# Patient Record
Sex: FEMALE | Race: WHITE | Employment: FULL TIME | URBAN - METROPOLITAN AREA
[De-identification: names, ages, dates, MRNs, and addresses within clinical notes are randomized per-mention and may not be internally consistent; named-entity substitution may affect disease eponyms.]

---

## 2017-03-23 ENCOUNTER — EMPLOYEE WELLNESS (OUTPATIENT)
Dept: OTHER | Age: 57
End: 2017-03-23

## 2017-12-19 PROBLEM — M18.12 ARTHRITIS OF CARPOMETACARPAL (CMC) JOINT OF LEFT THUMB: Status: ACTIVE | Noted: 2017-12-19

## 2018-03-01 ENCOUNTER — EMPLOYEE WELLNESS (OUTPATIENT)
Dept: OTHER | Age: 58
End: 2018-03-01

## 2018-03-01 LAB
CHOLESTEROL, TOTAL: 184 MG/DL (ref 0–199)
GLUCOSE BLD-MCNC: 86 MG/DL (ref 74–107)
HDLC SERPL-MCNC: 77 MG/DL
LDL CHOLESTEROL CALCULATED: 95 MG/DL (ref 0–99)
TRIGL SERPL-MCNC: 59 MG/DL (ref 0–149)

## 2018-03-20 VITALS — WEIGHT: 135 LBS | BODY MASS INDEX: 21.79 KG/M2

## 2018-03-29 ENCOUNTER — OFFICE VISIT (OUTPATIENT)
Dept: ORTHOPEDIC SURGERY | Age: 58
End: 2018-03-29
Payer: COMMERCIAL

## 2018-03-29 VITALS
DIASTOLIC BLOOD PRESSURE: 90 MMHG | TEMPERATURE: 97.2 F | HEIGHT: 66 IN | SYSTOLIC BLOOD PRESSURE: 141 MMHG | HEART RATE: 73 BPM | BODY MASS INDEX: 21.21 KG/M2 | WEIGHT: 132 LBS

## 2018-03-29 DIAGNOSIS — G56.01 CARPAL TUNNEL SYNDROME OF RIGHT WRIST: ICD-10-CM

## 2018-03-29 DIAGNOSIS — M18.12 ARTHRITIS OF CARPOMETACARPAL (CMC) JOINT OF LEFT THUMB: Primary | ICD-10-CM

## 2018-03-29 PROCEDURE — L3908 WHO COCK-UP NONMOLDE PRE OTS: HCPCS | Performed by: ORTHOPAEDIC SURGERY

## 2018-03-29 PROCEDURE — 20600 DRAIN/INJ JOINT/BURSA W/O US: CPT | Performed by: ORTHOPAEDIC SURGERY

## 2018-03-29 PROCEDURE — 99213 OFFICE O/P EST LOW 20 MIN: CPT | Performed by: ORTHOPAEDIC SURGERY

## 2018-03-29 RX ORDER — BUPIVACAINE HYDROCHLORIDE 2.5 MG/ML
2 INJECTION, SOLUTION INFILTRATION; PERINEURAL ONCE
Status: COMPLETED | OUTPATIENT
Start: 2018-03-29 | End: 2018-03-29

## 2018-03-29 RX ADMIN — BUPIVACAINE HYDROCHLORIDE 5 MG: 2.5 INJECTION, SOLUTION INFILTRATION; PERINEURAL at 15:38

## 2018-03-29 NOTE — PROGRESS NOTES
Halina Miller  presents with recurring pain at the base of the left thumb, had a couple of months of significant relief following the injection the pain is recently recurring interfering with  and other daily activities. She request repeat injection today. Also complains of chronic numbness tingling in the radial fingers of the right hand and thumb which is interfering with sleep and sometimes with work. No such symptoms of numbness on the left side no other joint complaints. Allergies; medications; past medical, surgical, family, and social history; and problem list have been reviewed today and updated as indicated in this encounter. Exam: Left hand shows swelling and tenderness the CMC of the thumb, negative Finkelstein, intact motor sensory exam no thenar or intrinsic atrophy. Right hand also shows no intrinsic or thenar atrophy but positive Phalen's negative Tinel's at the wrist.    Radiographs: Prior x-rays left hand confirming advanced if not end-stage CMC arthritis left thumb. Assessment and Plan: Impression is CMC arthritis left thumb, probable carpal tunnel syndrome right hand. At the patient's request I injected left thumb CMC with Kenalog and Marcaine without difficulty complication tolerated well. Also gave her a resting braces for the right hand to be worn at night and we discussed other treatment alternatives of carpal tunnel symptoms persist with possible nerve conduction studies to follow. Questions asked and answered follow-up as needed.

## 2018-04-02 VITALS — BODY MASS INDEX: 21.47 KG/M2 | WEIGHT: 133 LBS

## 2018-12-11 ENCOUNTER — OFFICE VISIT (OUTPATIENT)
Dept: ORTHOPEDIC SURGERY | Age: 58
End: 2018-12-11
Payer: COMMERCIAL

## 2018-12-11 VITALS
WEIGHT: 130 LBS | DIASTOLIC BLOOD PRESSURE: 83 MMHG | TEMPERATURE: 97.1 F | HEIGHT: 66 IN | HEART RATE: 70 BPM | BODY MASS INDEX: 20.89 KG/M2 | SYSTOLIC BLOOD PRESSURE: 137 MMHG

## 2018-12-11 DIAGNOSIS — M18.12 ARTHRITIS OF CARPOMETACARPAL (CMC) JOINT OF LEFT THUMB: Primary | ICD-10-CM

## 2018-12-11 PROCEDURE — 99213 OFFICE O/P EST LOW 20 MIN: CPT | Performed by: ORTHOPAEDIC SURGERY

## 2018-12-11 PROCEDURE — 20600 DRAIN/INJ JOINT/BURSA W/O US: CPT | Performed by: ORTHOPAEDIC SURGERY

## 2018-12-11 RX ORDER — BUPIVACAINE HYDROCHLORIDE 2.5 MG/ML
2 INJECTION, SOLUTION INFILTRATION; PERINEURAL ONCE
Status: COMPLETED | OUTPATIENT
Start: 2018-12-11 | End: 2018-12-11

## 2018-12-11 RX ORDER — TRIAMCINOLONE ACETONIDE 40 MG/ML
20 INJECTION, SUSPENSION INTRA-ARTICULAR; INTRAMUSCULAR ONCE
Status: COMPLETED | OUTPATIENT
Start: 2018-12-11 | End: 2018-12-11

## 2018-12-11 RX ADMIN — BUPIVACAINE HYDROCHLORIDE 5 MG: 2.5 INJECTION, SOLUTION INFILTRATION; PERINEURAL at 09:26

## 2018-12-11 RX ADMIN — TRIAMCINOLONE ACETONIDE 20 MG: 40 INJECTION, SUSPENSION INTRA-ARTICULAR; INTRAMUSCULAR at 09:26

## 2018-12-11 NOTE — PROGRESS NOTES
today for hand pain. Diagnoses and all orders for this visit:    Arthritis of carpometacarpal (CMC) joint of left thumb  -     [place] ARTHROCENTESIS ASPIR&/INJ SMALL JT/BURSA W/O US    Other orders  -     triamcinolone acetonide (KENALOG-40) injection 20 mg; Inject 0.5 mLs into the articular space once  -     bupivacaine (MARCAINE) 0.25 % injection 5 mg; Inject 2 mLs into the articular space once     Treatment options were again reviewed, the patient. She is doing well with bracing and occasional injections. With recurrence of pain and at her request today I injected left thumb CMC with Kenalog and Marcaine no difficulty or complication tolerated well. Continued use of over-the-counter as as needed was discussed, potential for surgical intervention versus ongoing conservative management as well. Questions asked and answered follow-up as needed. Return if symptoms worsen or fail to improve.      12/11/2018  9:45 AM      Patient Active Problem List   Diagnosis    Arthritis of carpometacarpal (CMC) joint of left thumb    Carpal tunnel syndrome of right wrist       Past Medical History:   Diagnosis Date    Left ankle instability     for or 10/21/2016    PONV (postoperative nausea and vomiting)     severe immediately per pt    Raynaud's disease     Skin cancer     neck/shoulder left    Wears glasses        Past Surgical History:   Procedure Laterality Date    ANKLE SURGERY  10/21/2016    stabilization left ankle    APPENDECTOMY      COLONOSCOPY  12/21/2017    normal    SKIN BIOPSY      TONSILLECTOMY         No Known Allergies    Social History     Social History    Marital status:      Spouse name: N/A    Number of children: N/A    Years of education: N/A     Social History Main Topics    Smoking status: Never Smoker    Smokeless tobacco: Never Used    Alcohol use 1.2 oz/week     2 Glasses of wine per week    Drug use: No    Sexual activity: No     Other Topics Concern    None     Social

## 2019-04-10 ENCOUNTER — HOSPITAL ENCOUNTER (OUTPATIENT)
Dept: GENERAL RADIOLOGY | Age: 59
Discharge: HOME | End: 2019-04-12
Payer: COMMERCIAL

## 2019-04-10 DIAGNOSIS — Z13.9 VISIT FOR SCREENING: ICD-10-CM

## 2019-04-10 PROCEDURE — 77063 BREAST TOMOSYNTHESIS BI: CPT

## 2019-04-16 ENCOUNTER — OFFICE VISIT (OUTPATIENT)
Dept: ORTHOPEDIC SURGERY | Age: 59
End: 2019-04-16
Payer: COMMERCIAL

## 2019-04-16 VITALS
HEART RATE: 69 BPM | DIASTOLIC BLOOD PRESSURE: 82 MMHG | TEMPERATURE: 97.7 F | WEIGHT: 130 LBS | SYSTOLIC BLOOD PRESSURE: 142 MMHG | HEIGHT: 66 IN | BODY MASS INDEX: 20.89 KG/M2

## 2019-04-16 DIAGNOSIS — M18.12 ARTHRITIS OF CARPOMETACARPAL (CMC) JOINT OF LEFT THUMB: Primary | ICD-10-CM

## 2019-04-16 PROCEDURE — 99213 OFFICE O/P EST LOW 20 MIN: CPT | Performed by: ORTHOPAEDIC SURGERY

## 2019-04-16 PROCEDURE — 20600 DRAIN/INJ JOINT/BURSA W/O US: CPT | Performed by: ORTHOPAEDIC SURGERY

## 2019-04-16 RX ORDER — LIDOCAINE HYDROCHLORIDE 10 MG/ML
0.5 INJECTION, SOLUTION INFILTRATION; PERINEURAL ONCE
Status: COMPLETED | OUTPATIENT
Start: 2019-04-16 | End: 2019-04-16

## 2019-04-16 RX ORDER — TRIAMCINOLONE ACETONIDE 40 MG/ML
20 INJECTION, SUSPENSION INTRA-ARTICULAR; INTRAMUSCULAR ONCE
Status: COMPLETED | OUTPATIENT
Start: 2019-04-16 | End: 2019-04-16

## 2019-04-16 RX ADMIN — LIDOCAINE HYDROCHLORIDE 0.5 ML: 10 INJECTION, SOLUTION INFILTRATION; PERINEURAL at 11:41

## 2019-04-16 RX ADMIN — TRIAMCINOLONE ACETONIDE 20 MG: 40 INJECTION, SUSPENSION INTRA-ARTICULAR; INTRAMUSCULAR at 11:42

## 2019-04-16 NOTE — PROGRESS NOTES
Chief Complaint:   Chief Complaint   Patient presents with    Finger Pain     Lt thumb pain, requesting injection. C/O reddened area at least once per week area of Lt [facility] joint. Disappears within 2-3 days, never breaks the skin and is never painful. Pt has pictures        Reynaldo Lee  presents with recurring pain at the basal left thumb, once again interfering with activities involving  pinch and dexterity, she notices occasional superficial discoloration, she showed me some pictures that suggested intradermal hemorrhage, these have resolved spontaneously and are occurring intermittently. No numbness tingling in the fingers no other joint complaints, no fever chills sweats weight loss or other constitutional symptoms. Allergies; medications; past medical, surgical, family, and social history; and problem list have been reviewed today and updated as indicated in this encounter seen below. Exam: Left thumb CMC is deformed, there is focal swelling and tenderness, no erythema appreciated but possibly some late resolving subcutaneous ecchymosis. CMC is tender to palpation with pain on axial load and rotation.  strength is painful. Radiographs: Deferred today previous radiographs are reviewed showing end-stage degenerative arthritis of the left thumb CMC. Ania Neri was seen today for finger pain. Diagnoses and all orders for this visit:    Arthritis of carpometacarpal (CMC) joint of left thumb  -     [place] ARTHROCENTESIS ASPIR&/INJ SMALL JT/BURSA W/O US    Other orders  -     triamcinolone acetonide (KENALOG-40) injection 20 mg  -     lidocaine 1 % injection 0.5 mL       Treatment alternatives were reviewed, the patient's request I injected the left thumb CMC with Kenalog and lidocaine this was done sterile technique without difficulty complication or bleeding.  It is not clear whether the discoloration she has seen is actually hemorrhage within the joint or possibly subcu is bleeding, the patient is not on any anticoagulants, she has not had any screening lab work other than glucose and cholesterol, she was advised to follow-up with primary care physician for formal well adult visit presumably to include more comprehensive blood work. Questions asked and answered follow-up here in a few months or as needed. Return if symptoms worsen or fail to improve. Current Outpatient Medications   Medication Sig Dispense Refill    TURMERIC PO Take by mouth daily      Probiotic Product (PROBIOTIC DAILY PO) Take by mouth daily      melatonin 3 MG TABS tablet Take 3 mg by mouth nightly as needed      Omega-3 Fatty Acids (OMEGA 3 PO) Take by mouth daily Ld 12/15      Calcium-Magnesium-Vitamin D (CALCIUM 500 PO) Take 1 tablet by mouth 2 times daily Ld 12/15/2017 all vitamins and supplements      Multiple Vitamins-Minerals (THERAPEUTIC MULTIVITAMIN-MINERALS) tablet Take 1 tablet by mouth daily Ld 12/15       No current facility-administered medications for this visit.         Patient Active Problem List   Diagnosis    Arthritis of carpometacarpal (CMC) joint of left thumb    Carpal tunnel syndrome of right wrist       Past Medical History:   Diagnosis Date    Left ankle instability     for or 10/21/2016    PONV (postoperative nausea and vomiting)     severe immediately per pt    Raynaud's disease     Skin cancer     neck/shoulder left    Wears glasses        Past Surgical History:   Procedure Laterality Date    ANKLE SURGERY  10/21/2016    stabilization left ankle    APPENDECTOMY      COLONOSCOPY  12/21/2017    normal    SKIN BIOPSY      TONSILLECTOMY         Allergies   Allergen Reactions    Anesthesia S-I-60 Nausea And Vomiting     Severe nausea and vomiting with anesthesia (drug name unknown to patient)        Social History     Socioeconomic History    Marital status:      Spouse name: None    Number of children: None    Years of education: None    Highest education level: None   Occupational History    None   Social Needs    Financial resource strain: None    Food insecurity:     Worry: None     Inability: None    Transportation needs:     Medical: None     Non-medical: None   Tobacco Use    Smoking status: Never Smoker    Smokeless tobacco: Never Used   Substance and Sexual Activity    Alcohol use: Yes     Alcohol/week: 1.2 oz     Types: 2 Glasses of wine per week    Drug use: No    Sexual activity: Never   Lifestyle    Physical activity:     Days per week: None     Minutes per session: None    Stress: None   Relationships    Social connections:     Talks on phone: None     Gets together: None     Attends Christianity service: None     Active member of club or organization: None     Attends meetings of clubs or organizations: None     Relationship status: None    Intimate partner violence:     Fear of current or ex partner: None     Emotionally abused: None     Physically abused: None     Forced sexual activity: None   Other Topics Concern    None   Social History Narrative    None       Family History   Problem Relation Age of Onset    Stroke Father     ADHD Brother         Adopted         Review of Systems  As follows except as previously noted in HPI:  Constitutional: Negative for chills, diaphoresis, fatigue, fever and unexpected weight change. Respiratory: Negative for cough, shortness of breath and wheezing. Cardiovascular: Negative for chest pain and palpitations. Neurological: Negative for dizziness, syncope, cephalgia. GI / : negative  Musculoskeletal: see HPI       Objective:   Physical Exam   Constitutional: Oriented to person, place, and time. and appears well-developed and well-nourished. :   Head: Normocephalic and atraumatic. Eyes: EOM are normal.   Neck: Neck supple. Cardiovascular: Normal rate and regular rhythm. Pulmonary/Chest: Effort normal. No stridor. No respiratory distress, no wheezes. Abdominal:  No abnormal distension.     Neurological: Alert

## 2019-07-10 ENCOUNTER — OFFICE VISIT (OUTPATIENT)
Dept: SURGERY | Age: 59
End: 2019-07-10
Payer: COMMERCIAL

## 2019-07-10 VITALS
OXYGEN SATURATION: 99 % | SYSTOLIC BLOOD PRESSURE: 122 MMHG | BODY MASS INDEX: 20.89 KG/M2 | RESPIRATION RATE: 18 BRPM | WEIGHT: 130 LBS | HEIGHT: 66 IN | DIASTOLIC BLOOD PRESSURE: 74 MMHG | TEMPERATURE: 98 F | HEART RATE: 77 BPM

## 2019-07-10 DIAGNOSIS — M18.12 ARTHRITIS OF CARPOMETACARPAL (CMC) JOINT OF LEFT THUMB: Primary | ICD-10-CM

## 2019-07-10 PROCEDURE — 99204 OFFICE O/P NEW MOD 45 MIN: CPT | Performed by: PLASTIC SURGERY

## 2019-07-10 NOTE — PROGRESS NOTES
Resp 18   Ht 5' 6\" (1.676 m)   Wt 130 lb (59 kg)   LMP  (LMP Unknown)   SpO2 99%   Breastfeeding? No   BMI 20.98 kg/m²   CONSTITUTIONAL:  awake, alert, cooperative, no apparent distress, and appears stated age  LUNGS:  No increased work of breathing, good air exchange, clear to auscultation bilaterally, no crackles or wheezing  CARDIOVASCULAR:  Normal apical impulse, regular rate and rhythm, normal S1 and S2, no S3 or S4, and no murmur noted      The depression is noted at the base of the right metacarpal approximately 2.5 cm round by 1 cm in depth there is mild tenderness to palpation as there is decreased soft tissue and palpable bone just deep to the dermis. Capillary refill of the dermis overlying the concavity is brisk. Full range of motion is noted to the right thumb  strength 5 of 5. 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 The only available treatments to replace lost fat in this instance would be autologous fat grafting. The patient states she is a very poor anesthesia candidate and is not willing to undergo anesthesia for elective type surgeries. Furthermore, I believe the trauma of injecting fat around the joint space or any other dermal filler for that matter may exacerbate her arthritis. I have discussed the concept of wide-awake surgery with the patient and she may want to consider a referral to our hand surgeon for discussion on correction of her significant arthritis. She voices understanding and appreciation. No plan surgical intervention at this time. Referral placed to orthopedic hand surgery Dr. Onita Dakins. F/U PRN    I attest that the patient was seen and examined by me, and concur with the documentation above. I agree with the assessment and the plan outlined.     This document is generated, in part, by voice recognition software and thus  syntax and grammatical errors are possible.     Yancy Pereira  10:23 AM  8/6/2019

## 2020-02-17 ENCOUNTER — TELEPHONE (OUTPATIENT)
Dept: ORTHOPEDIC SURGERY | Age: 60
End: 2020-02-17

## 2020-02-17 NOTE — TELEPHONE ENCOUNTER
Dr Lila Vizcarra office called requesting that patient appointment be moved up. Advised she is scheduled for 4/9/20 and I could add to cancellation list and also advise of the physician request to Dr Mari Cohn. Patient has already been under the care of Dr Jackie Peralta but wishes not to undergo treatment with him.

## 2020-02-28 ENCOUNTER — TELEPHONE (OUTPATIENT)
Dept: ORTHOPEDIC SURGERY | Age: 60
End: 2020-02-28

## 2020-03-02 ENCOUNTER — OFFICE VISIT (OUTPATIENT)
Dept: ORTHOPEDIC SURGERY | Age: 60
End: 2020-03-02
Payer: COMMERCIAL

## 2020-03-02 VITALS
SYSTOLIC BLOOD PRESSURE: 148 MMHG | BODY MASS INDEX: 21.38 KG/M2 | RESPIRATION RATE: 18 BRPM | WEIGHT: 133 LBS | DIASTOLIC BLOOD PRESSURE: 79 MMHG | HEIGHT: 66 IN | HEART RATE: 76 BPM

## 2020-03-02 PROCEDURE — 20605 DRAIN/INJ JOINT/BURSA W/O US: CPT | Performed by: ORTHOPAEDIC SURGERY

## 2020-03-02 PROCEDURE — 99203 OFFICE O/P NEW LOW 30 MIN: CPT | Performed by: ORTHOPAEDIC SURGERY

## 2020-03-02 RX ORDER — BETAMETHASONE SODIUM PHOSPHATE AND BETAMETHASONE ACETATE 3; 3 MG/ML; MG/ML
6 INJECTION, SUSPENSION INTRA-ARTICULAR; INTRALESIONAL; INTRAMUSCULAR; SOFT TISSUE ONCE
Status: COMPLETED | OUTPATIENT
Start: 2020-03-02 | End: 2020-03-02

## 2020-03-02 RX ADMIN — BETAMETHASONE SODIUM PHOSPHATE AND BETAMETHASONE ACETATE 6 MG: 3; 3 INJECTION, SUSPENSION INTRA-ARTICULAR; INTRALESIONAL; INTRAMUSCULAR; SOFT TISSUE at 12:46

## 2020-03-02 NOTE — PROGRESS NOTES
Right Upper Arm, Position: Sitting, Cuff Size: Medium Adult)   Pulse 76   Resp 18   Ht 5' 6\" (1.676 m)   Wt 133 lb (60.3 kg)   LMP  (LMP Unknown)   BMI 21.47 kg/m²   CONSTITUTIONAL:  awake, alert, cooperative, no apparent distress, and appears stated age  EYES:  Lids and lashes normal, pupils equal, round and reactive to light, extra ocular muscles intact, sclera clear, conjunctiva normal  ENT:  Normocephalic, without obvious abnormality, atraumatic, sinuses nontender on palpation, external ears without lesions, oral pharynx with moist mucus membranes, tonsils without erythema or exudates, gums normal and good dentition. NECK:  Supple, symmetrical, trachea midline, no adenopathy, thyroid symmetric, not enlarged and no tenderness, skin normal  NEUROLOGIC:  Awake, alert, oriented to name, place and time. Cranial nerves II-XII are grossly intact. Motor is 5 out of 5 bilaterally. Sensory is intact.  gait is normal.  MUSCULOSKELETAL:    Left upper extremity: Nontender about the shoulder and elbow with good range of motion. Negative Tinel's of the cubital tunnel, positive Tinel's of the carpal tunnel, negative Ladan's, negative Finkelstein's, positive CMC grind, positive atrophy over the [facility] joint of the thumb. Negative tenderness over the A1 pulleys with no active triggering. Full flexion-extension of the fingers. Negative Wartenberg's and cross finger testing. APB strength 5 out of 5. Median, ulnar, radial nerves intact light touch. Brisk capillary refill. Gross motor 5    DATA:    CBC:   Lab Results   Component Value Date    WBC 5.1 01/24/2012    RBC 4.20 01/24/2012    HGB 13.6 01/24/2012    HCT 39.2 01/24/2012    MCV 93.4 01/24/2012    MCH 32.4 01/24/2012    MCHC 34.7 01/24/2012    RDW 13.0 01/24/2012     01/24/2012    MPV 9.9 01/24/2012     PT/INR:  No results found for: PROTIME, INR    Radiology Review: X-rays of the left hand were obtained today in the office and reviewed with patient.   4 views: AP, lateral, oblique, dedicated thumb view demonstrate Eliana Marion stage III thumb CMC joint arthritis. No acute fractures or dislocations. Impression: Left thumb CMC joint arthritis    IMPRESSION:  · Left thumb CMC joint arthritis    PLAN:  Discussed findings with patient. Discussed conservative and surgical management with the patient. Did discuss she is a candidate for stablyx implant. Patient like to repeat a cortisone injection to her left thumb at today's visit. This was provided from the volar approach and tolerated well. Patient may repeat the injections every 3 months as needed. All questions answered. Procedure Note Wrist Thumb CMC Cortisone Injection    The left wrist thumb CMC joint was identified as the injection site. The risk and benefits of a cortisone injection were explained and the patient consented to the injection. Under sterile conditions, the wrist was injected with a mixture of 1 mL of 1% Lidocaine and 1 mL of 6 mg/mL Betamethasone without complication. A sterile bandage was applied    I have seen and evaluated the patient and agree with the above assessment and plan on today's visit. I have performed the key components of the history and physical examination with significant findings of left thumb basal joint arthritis. I concur with the findings and plan as documented.     Maicol Cheng MD  3/2/2020

## 2020-06-01 ENCOUNTER — OFFICE VISIT (OUTPATIENT)
Dept: ORTHOPEDIC SURGERY | Age: 60
End: 2020-06-01
Payer: COMMERCIAL

## 2020-06-01 VITALS — HEIGHT: 66 IN | WEIGHT: 132 LBS | TEMPERATURE: 97.7 F | RESPIRATION RATE: 18 BRPM | BODY MASS INDEX: 21.21 KG/M2

## 2020-06-01 PROCEDURE — 99214 OFFICE O/P EST MOD 30 MIN: CPT | Performed by: ORTHOPAEDIC SURGERY

## 2020-06-01 PROCEDURE — 20550 NJX 1 TENDON SHEATH/LIGAMENT: CPT | Performed by: ORTHOPAEDIC SURGERY

## 2020-06-01 RX ORDER — BETAMETHASONE SODIUM PHOSPHATE AND BETAMETHASONE ACETATE 3; 3 MG/ML; MG/ML
6 INJECTION, SUSPENSION INTRA-ARTICULAR; INTRALESIONAL; INTRAMUSCULAR; SOFT TISSUE ONCE
Status: COMPLETED | OUTPATIENT
Start: 2020-06-01 | End: 2020-06-01

## 2020-06-01 RX ADMIN — BETAMETHASONE SODIUM PHOSPHATE AND BETAMETHASONE ACETATE 6 MG: 3; 3 INJECTION, SUSPENSION INTRA-ARTICULAR; INTRALESIONAL; INTRAMUSCULAR; SOFT TISSUE at 10:15

## 2020-06-01 NOTE — PROGRESS NOTES
Department of Orthopedic Surgery  History and Physical      CHIEF COMPLAINT:  Left thumb pain    HISTORY OF PRESENT ILLNESS:                The patient is a RHD 62 y.o. female who presents with left thumb pain. Patient has had left thumb pain for the last 2 years. She has had 3 cortisone injections to the left thumb CMC joint. Following the injection she would have issues with fat atrophy. She was getting at least 6 months of pain relief with the injections. She reports good results with the previous injection however her symptoms have returned. She now has about a month of symptoms related to the lateral epicondylar region. She reports this is mostly while lifting weights. She reports no significant injuries. Past Medical History:        Diagnosis Date    Left ankle instability     for or 10/21/2016    PONV (postoperative nausea and vomiting)     severe immediately per pt    Raynaud's disease     Skin cancer     neck/shoulder left    Wears glasses      Past Surgical History:        Procedure Laterality Date    ANKLE SURGERY  10/21/2016    stabilization left ankle    APPENDECTOMY      COLONOSCOPY  12/21/2017    normal    SKIN BIOPSY      TONSILLECTOMY       Current Medications:   No current facility-administered medications for this visit. Allergies:  Anesthesia s-i-60    Social History:   TOBACCO:   reports that she has never smoked. She has never used smokeless tobacco.  ETOH:   reports current alcohol use of about 2.0 standard drinks of alcohol per week. DRUGS:   reports no history of drug use.   ACTIVITIES OF DAILY LIVING:    OCCUPATION:    Family History:       Problem Relation Age of Onset    Stroke Father     ADHD Brother         Adopted       REVIEW OF SYSTEMS:  CONSTITUTIONAL:  negative  EYES:  negative  HEENT:  negative  RESPIRATORY:  negative  CARDIOVASCULAR:  negative  GASTROINTESTINAL:  negative  GENITOURINARY:  negative  INTEGUMENT/BREAST:  negative  HEMATOLOGIC/LYMPHATIC: HGB 13.6 01/24/2012    HCT 39.2 01/24/2012    MCV 93.4 01/24/2012    MCH 32.4 01/24/2012    MCHC 34.7 01/24/2012    RDW 13.0 01/24/2012     01/24/2012    MPV 9.9 01/24/2012     PT/INR:  No results found for: PROTIME, INR    Radiology Review: X-rays of the left hand were  reviewed with patient. 4 views: AP, lateral, oblique, dedicated thumb view demonstrate Harpal Bulls stage III thumb CMC joint arthritis. No acute fractures or dislocations. Impression: Left thumb CMC joint arthritis    X-rays of the left elbow AP and lateral views were obtained today in the office. Negative for acute fractures or dislocations. Soft tissues within normal limits. Impression office x-rays: Negative for fracture dislocation left elbow    IMPRESSION:  · Left thumb CMC joint arthritis stage III  · Left lateral epicondylitis    PLAN:  Discussed findings with patient. Explained Stablyx [facility] arthroplasty with the patient in detail. All questions were answered. She is interested in proceeding with left thumb Stablyx arthroplasty. In addition discussed lateral condyle-itis. Counterforce brace and home exercise program was provided. She is already taking anti-inflammatories. All questions answered. Procedure Note Wrist Thumb CMC Cortisone Injection    The left wrist thumb CMC joint was identified as the injection site. The risk and benefits of a cortisone injection were explained and the patient consented to the injection. Under sterile conditions, the wrist was injected with a mixture of 1 mL of 1% Lidocaine and 1 mL of 6 mg/mL Betamethasone without complication.  A sterile bandage was applied      I explained the risks, benefits, alternatives and complications of surgery with the patient including but not limited to the risks of infection, possible damage to nerves, vessels, or tendons, stiffness, loss of range of motion, scar sensitivity, wound healing complications, periprosthetic fractures and dislocations, worsening

## 2020-06-01 NOTE — PATIENT INSTRUCTIONS
your arm in front of you with your palm up. 2. Bend your wrist, pointing your hand toward the floor. 3. With your other hand, gently bend your wrist farther until you feel a mild to moderate stretch in your forearm. 4. Hold for at least 15 to 30 seconds. Repeat 2 to 4 times. Wrist extensor stretch   1. Repeat steps 1 to 4 of the stretch above but begin with your extended hand palm down. Ball or sock squeeze   1. Hold a tennis ball (or a rolled-up sock) in your hand. 2. Make a fist around the ball (or sock) and squeeze. 3. Hold for about 6 seconds, and then relax for up to 10 seconds. 4. Repeat 8 to 12 times. 5. Switch the ball (or sock) to your other hand and do 8 to 12 times. Wrist deviation   1. Sit so that your arm is supported but your hand hangs off the edge of a flat surface, such as a table. 2. Hold your hand out like you are shaking hands with someone. 3. Move your hand up and down. 4. Repeat this motion 8 to 12 times. 5. Switch arms. 6. Try to do this exercise twice with each hand. Wrist curls   1. Place your forearm on a table with your hand hanging over the edge of the table, palm up. 2. Place a 1- to 2-pound weight in your hand. This may be a dumbbell, a can of food, or a filled water bottle. 3. Slowly raise and lower the weight while keeping your forearm on the table and your palm facing up. 4. Repeat this motion 8 to 12 times. 5. Switch arms, and do steps 1 through 4.  6. Repeat with your hand facing down toward the floor. Switch arms. Biceps curls   1. Sit leaning forward with your legs slightly spread and your left hand on your left thigh. 2. Place your right elbow on your right thigh, and hold the weight with your forearm horizontal.  3. Slowly curl the weight up and toward your chest.  4. Repeat this motion 8 to 12 times. 5. Switch arms, and do steps 1 through 4. Follow-up care is a key part of your treatment and safety.  Be sure to make and go to all tests can help make sure another disease isn't causing your symptoms. How is it treated? Arthritis at the base of your thumb may be treated with rest, pain relievers, steroid medicines, using a brace or splint, and--in some cases--surgery. To help relieve pain in the joint, rest your sore hand. Switch hands for some activities. You can try heat and cold therapy, such as hot compresses, paraffin wax, cold packs, or ice massage. Your doctor may give you a splint to wear during some activities or when pain flares up. You can often manage mild or moderate arthritis pain with over-the-counter pain relievers. These include medicines that reduce swelling, such as ibuprofen or naproxen. You can also use acetaminophen. Sometimes these medicines are in creams that you can rub on your thumb and hand. Your doctor may also prescribe other medicine for your pain. For some people, steroid shots may be an option. If none of the treatments work, your doctor may discuss surgery with you. Follow-up care is a key part of your treatment and safety. Be sure to make and go to all appointments, and call your doctor if you are having problems. It's also a good idea to know your test results and keep a list of the medicines you take. Where can you learn more? Go to https://[employer]. org and sign in to your My [employer] account. Enter T110 in the Door 6 box to learn more about \"Learning About Arthritis at the [facility] of the [facility]. \"     If you do not have an account, please click on the \"Sign Up Now\" link. Current as of: December 9, 2019               Content Version: 12.5  © 1598-3376 [employer]. Care instructions adapted under license by [facility] ([facility]). If you have questions about a medical condition or this instruction, always ask your healthcare professional. [address] any warranty or liability for your use of this information.

## 2020-09-10 ENCOUNTER — PREP FOR PROCEDURE (OUTPATIENT)
Dept: ORTHOPEDIC SURGERY | Age: 60
End: 2020-09-10

## 2020-09-10 RX ORDER — SODIUM CHLORIDE 0.9 % (FLUSH) 0.9 %
10 SYRINGE (ML) INJECTION EVERY 12 HOURS SCHEDULED
Status: CANCELLED | OUTPATIENT
Start: 2020-09-10

## 2020-09-10 RX ORDER — SODIUM CHLORIDE 0.9 % (FLUSH) 0.9 %
10 SYRINGE (ML) INJECTION PRN
Status: CANCELLED | OUTPATIENT
Start: 2020-09-10

## 2020-09-10 RX ORDER — SODIUM CHLORIDE 9 MG/ML
INJECTION, SOLUTION INTRAVENOUS CONTINUOUS
Status: CANCELLED | OUTPATIENT
Start: 2020-09-10

## 2020-09-11 ENCOUNTER — HOSPITAL ENCOUNTER (OUTPATIENT)
Age: 60
Discharge: HOME | End: 2020-09-13
Payer: COMMERCIAL

## 2020-09-11 PROCEDURE — U0003 INFECTIOUS AGENT DETECTION BY NUCLEIC ACID (DNA OR RNA); SEVERE ACUTE RESPIRATORY SYNDROME CORONAVIRUS 2 (SARS-COV-2) (CORONAVIRUS DISEASE [COVID-19]), AMPLIFIED PROBE TECHNIQUE, MAKING USE OF HIGH THROUGHPUT TECHNOLOGIES AS DESCRIBED BY CMS-2020-01-R: HCPCS

## 2020-09-12 LAB
SARS-COV-2: NOT DETECTED
SOURCE: NORMAL

## 2020-09-14 RX ORDER — NAPROXEN 250 MG/1
250 TABLET ORAL 2 TIMES DAILY WITH MEALS
COMMUNITY
End: 2020-09-24 | Stop reason: CLARIF

## 2020-09-14 NOTE — PROGRESS NOTES
Have you been tested for COVID  Yes           Have you been told you were positive for COVID No  Have you had any known exposure to someone that is positive for COVID No  Do you have a cough                   No              Do you have shortness of breath No                 Do you have a sore throat            No                Are you having chills                    No                Are you having muscle aches. No                    Please come to the hospital wearing a mask and have your significant other wear a mask as well. Both of you should check your temperature before leaving to come here,  if it is 100 or higher please call [phone] for instruction.

## 2020-09-14 NOTE — PROGRESS NOTES
[facility] PRE-ADMISSION TESTING INSTRUCTIONS    The Preadmission Testing patient is instructed accordingly using the following criteria (check applicable):    ARRIVAL INSTRUCTIONS:  [x] Parking the day of Surgery is located in the Main Entrance lot. Upon entering the door, make an immediate right to the surgery reception desk    [x] Bring photo ID and insurance card    [] Bring in a copy of Living will or Durable Power of  papers. [x] Please be sure to arrange transportation to and from the hospital    [x] Please arrange for someone to be with you the remainder of the day due to having anesthesia      GENERAL INSTRUCTIONS:    [x] Nothing by mouth after midnight, including gum, candy, mints or water    [x] You may brush your teeth, but do not swallow any water    [] Take medications as instructed with 1-2 oz of water None  [] Stop herbal supplements and vitamins 5 days prior to procedure    [] Follow preop dosing of blood thinners per physician instructions    [] Do not take insulin or oral diabetic medications    [] If diabetic and have low blood sugar or feel symptomatic, take 1-2oz apple juice or glucose tablets    [] Bring inhalers day of surgery    [] Bring C-PAP/ Bi-Pap day of surgery    [] Bring urine specimen day of surgery    [x] Antibacterial Soap shower or bath AM of Surgery, no lotion, powders or creams to surgical site    [] Follow bowel prep as instructed per surgeon    [] No tobacco products within 24 hours of surgery     [x] No alcohol or illegal drug use within 24 hours of surgery.     [x] Jewelry, body piercing's, eyeglasses, contact lenses and dentures are not permitted into surgery (bring cases)      [x] Please do not wear any nail polish or make up on the day of surgery    [x] If not already done, you can expect a call from registration    [x] If surgeon requests a time change you will be notified the day prior to surgery    [x] If you receive a survey after

## 2020-09-16 ENCOUNTER — ANESTHESIA (OUTPATIENT)
Dept: OPERATING ROOM | Age: 60
End: 2020-09-16
Payer: COMMERCIAL

## 2020-09-16 ENCOUNTER — HOSPITAL ENCOUNTER (OUTPATIENT)
Age: 60
Setting detail: OUTPATIENT SURGERY
Discharge: HOME | End: 2020-09-16
Attending: ORTHOPAEDIC SURGERY | Admitting: ORTHOPAEDIC SURGERY
Payer: COMMERCIAL

## 2020-09-16 ENCOUNTER — ANESTHESIA EVENT (OUTPATIENT)
Dept: OPERATING ROOM | Age: 60
End: 2020-09-16
Payer: COMMERCIAL

## 2020-09-16 ENCOUNTER — APPOINTMENT (OUTPATIENT)
Dept: GENERAL RADIOLOGY | Age: 60
End: 2020-09-16
Attending: ORTHOPAEDIC SURGERY
Payer: COMMERCIAL

## 2020-09-16 VITALS
HEART RATE: 65 BPM | TEMPERATURE: 96.6 F | SYSTOLIC BLOOD PRESSURE: 142 MMHG | OXYGEN SATURATION: 96 % | BODY MASS INDEX: 21.69 KG/M2 | WEIGHT: 135 LBS | HEIGHT: 66 IN | DIASTOLIC BLOOD PRESSURE: 73 MMHG | RESPIRATION RATE: 16 BRPM

## 2020-09-16 VITALS — SYSTOLIC BLOOD PRESSURE: 155 MMHG | DIASTOLIC BLOOD PRESSURE: 93 MMHG | TEMPERATURE: 95.2 F | OXYGEN SATURATION: 100 %

## 2020-09-16 LAB
EKG ATRIAL RATE: 66 BPM
EKG P AXIS: 72 DEGREES
EKG P-R INTERVAL: 144 MS
EKG Q-T INTERVAL: 402 MS
EKG QRS DURATION: 82 MS
EKG QTC CALCULATION (BAZETT): 421 MS
EKG R AXIS: 28 DEGREES
EKG T AXIS: 60 DEGREES
EKG VENTRICULAR RATE: 66 BPM

## 2020-09-16 PROCEDURE — C1776 JOINT DEVICE (IMPLANTABLE): HCPCS | Performed by: ORTHOPAEDIC SURGERY

## 2020-09-16 PROCEDURE — 93005 ELECTROCARDIOGRAM TRACING: CPT | Performed by: PHYSICIAN ASSISTANT

## 2020-09-16 PROCEDURE — 2580000003 HC RX 258: Performed by: PHYSICIAN ASSISTANT

## 2020-09-16 PROCEDURE — 7100000010 HC PHASE II RECOVERY - FIRST 15 MIN: Performed by: ORTHOPAEDIC SURGERY

## 2020-09-16 PROCEDURE — 3209999900 FLUORO FOR SURGICAL PROCEDURES

## 2020-09-16 PROCEDURE — 6360000002 HC RX W HCPCS: Performed by: NURSE ANESTHETIST, CERTIFIED REGISTERED

## 2020-09-16 PROCEDURE — 25447 ARTHRP NTRCRP/CRP/MTCR NTRPS: CPT | Performed by: ORTHOPAEDIC SURGERY

## 2020-09-16 PROCEDURE — 6360000002 HC RX W HCPCS: Performed by: PHYSICIAN ASSISTANT

## 2020-09-16 PROCEDURE — 6370000000 HC RX 637 (ALT 250 FOR IP)

## 2020-09-16 PROCEDURE — 3600000013 HC SURGERY LEVEL 3 ADDTL 15MIN: Performed by: ORTHOPAEDIC SURGERY

## 2020-09-16 PROCEDURE — 6360000002 HC RX W HCPCS: Performed by: ANESTHESIOLOGY

## 2020-09-16 PROCEDURE — 7100000011 HC PHASE II RECOVERY - ADDTL 15 MIN: Performed by: ORTHOPAEDIC SURGERY

## 2020-09-16 PROCEDURE — 2709999900 HC NON-CHARGEABLE SUPPLY: Performed by: ORTHOPAEDIC SURGERY

## 2020-09-16 PROCEDURE — 3700000000 HC ANESTHESIA ATTENDED CARE: Performed by: ORTHOPAEDIC SURGERY

## 2020-09-16 PROCEDURE — 2580000003 HC RX 258: Performed by: NURSE ANESTHETIST, CERTIFIED REGISTERED

## 2020-09-16 PROCEDURE — 3600000003 HC SURGERY LEVEL 3 BASE: Performed by: ORTHOPAEDIC SURGERY

## 2020-09-16 PROCEDURE — 7100000000 HC PACU RECOVERY - FIRST 15 MIN: Performed by: ORTHOPAEDIC SURGERY

## 2020-09-16 PROCEDURE — 88304 TISSUE EXAM BY PATHOLOGIST: CPT

## 2020-09-16 PROCEDURE — 7100000001 HC PACU RECOVERY - ADDTL 15 MIN: Performed by: ORTHOPAEDIC SURGERY

## 2020-09-16 PROCEDURE — 88311 DECALCIFY TISSUE: CPT

## 2020-09-16 PROCEDURE — 3700000001 HC ADD 15 MINUTES (ANESTHESIA): Performed by: ORTHOPAEDIC SURGERY

## 2020-09-16 PROCEDURE — 6360000002 HC RX W HCPCS

## 2020-09-16 PROCEDURE — 2500000003 HC RX 250 WO HCPCS: Performed by: NURSE ANESTHETIST, CERTIFIED REGISTERED

## 2020-09-16 PROCEDURE — 2500000003 HC RX 250 WO HCPCS: Performed by: ORTHOPAEDIC SURGERY

## 2020-09-16 DEVICE — IMPLANTABLE DEVICE: Type: IMPLANTABLE DEVICE | Site: THUMB | Status: FUNCTIONAL

## 2020-09-16 RX ORDER — SCOLOPAMINE TRANSDERMAL SYSTEM 1 MG/1
1 PATCH, EXTENDED RELEASE TRANSDERMAL ONCE
Status: DISCONTINUED | OUTPATIENT
Start: 2020-09-16 | End: 2020-09-16 | Stop reason: HOSPADM

## 2020-09-16 RX ORDER — PROMETHAZINE HYDROCHLORIDE 25 MG/ML
INJECTION, SOLUTION INTRAMUSCULAR; INTRAVENOUS PRN
Status: DISCONTINUED | OUTPATIENT
Start: 2020-09-16 | End: 2020-09-16 | Stop reason: SDUPTHER

## 2020-09-16 RX ORDER — BUPIVACAINE HYDROCHLORIDE 5 MG/ML
INJECTION, SOLUTION EPIDURAL; INTRACAUDAL PRN
Status: DISCONTINUED | OUTPATIENT
Start: 2020-09-16 | End: 2020-09-16 | Stop reason: ALTCHOICE

## 2020-09-16 RX ORDER — PROPOFOL 10 MG/ML
INJECTION, EMULSION INTRAVENOUS PRN
Status: DISCONTINUED | OUTPATIENT
Start: 2020-09-16 | End: 2020-09-16 | Stop reason: SDUPTHER

## 2020-09-16 RX ORDER — PROMETHAZINE HYDROCHLORIDE 25 MG/ML
6.25 INJECTION, SOLUTION INTRAMUSCULAR; INTRAVENOUS
Status: DISCONTINUED | OUTPATIENT
Start: 2020-09-16 | End: 2020-09-16 | Stop reason: HOSPADM

## 2020-09-16 RX ORDER — SODIUM CHLORIDE 0.9 % (FLUSH) 0.9 %
10 SYRINGE (ML) INJECTION PRN
Status: DISCONTINUED | OUTPATIENT
Start: 2020-09-16 | End: 2020-09-16 | Stop reason: HOSPADM

## 2020-09-16 RX ORDER — HYDROCODONE BITARTRATE AND ACETAMINOPHEN 5; 325 MG/1; MG/1
1 TABLET ORAL
Status: DISCONTINUED | OUTPATIENT
Start: 2020-09-16 | End: 2020-09-16 | Stop reason: HOSPADM

## 2020-09-16 RX ORDER — FENTANYL CITRATE 50 UG/ML
25 INJECTION, SOLUTION INTRAMUSCULAR; INTRAVENOUS EVERY 5 MIN PRN
Status: DISCONTINUED | OUTPATIENT
Start: 2020-09-16 | End: 2020-09-16 | Stop reason: HOSPADM

## 2020-09-16 RX ORDER — SCOLOPAMINE TRANSDERMAL SYSTEM 1 MG/1
PATCH, EXTENDED RELEASE TRANSDERMAL
Status: DISCONTINUED
Start: 2020-09-16 | End: 2020-09-16 | Stop reason: HOSPADM

## 2020-09-16 RX ORDER — ONDANSETRON 2 MG/ML
INJECTION INTRAMUSCULAR; INTRAVENOUS PRN
Status: DISCONTINUED | OUTPATIENT
Start: 2020-09-16 | End: 2020-09-16 | Stop reason: SDUPTHER

## 2020-09-16 RX ORDER — LIDOCAINE HYDROCHLORIDE 20 MG/ML
INJECTION, SOLUTION EPIDURAL; INFILTRATION; INTRACAUDAL; PERINEURAL PRN
Status: DISCONTINUED | OUTPATIENT
Start: 2020-09-16 | End: 2020-09-16 | Stop reason: SDUPTHER

## 2020-09-16 RX ORDER — FENTANYL CITRATE 50 UG/ML
INJECTION, SOLUTION INTRAMUSCULAR; INTRAVENOUS PRN
Status: DISCONTINUED | OUTPATIENT
Start: 2020-09-16 | End: 2020-09-16 | Stop reason: SDUPTHER

## 2020-09-16 RX ORDER — OXYCODONE HYDROCHLORIDE AND ACETAMINOPHEN 5; 325 MG/1; MG/1
TABLET ORAL
Status: COMPLETED
Start: 2020-09-16 | End: 2020-09-16

## 2020-09-16 RX ORDER — SODIUM CHLORIDE 9 MG/ML
INJECTION, SOLUTION INTRAVENOUS CONTINUOUS
Status: DISCONTINUED | OUTPATIENT
Start: 2020-09-16 | End: 2020-09-16 | Stop reason: HOSPADM

## 2020-09-16 RX ORDER — PROMETHAZINE HYDROCHLORIDE 25 MG/1
25 TABLET ORAL 4 TIMES DAILY PRN
Qty: 20 TABLET | Refills: 0 | Status: SHIPPED | OUTPATIENT
Start: 2020-09-16 | End: 2020-09-23

## 2020-09-16 RX ORDER — DEXAMETHASONE SODIUM PHOSPHATE 4 MG/ML
INJECTION, SOLUTION INTRA-ARTICULAR; INTRALESIONAL; INTRAMUSCULAR; INTRAVENOUS; SOFT TISSUE PRN
Status: DISCONTINUED | OUTPATIENT
Start: 2020-09-16 | End: 2020-09-16 | Stop reason: SDUPTHER

## 2020-09-16 RX ORDER — MIDAZOLAM HYDROCHLORIDE 1 MG/ML
INJECTION INTRAMUSCULAR; INTRAVENOUS PRN
Status: DISCONTINUED | OUTPATIENT
Start: 2020-09-16 | End: 2020-09-16 | Stop reason: SDUPTHER

## 2020-09-16 RX ORDER — OXYCODONE HYDROCHLORIDE AND ACETAMINOPHEN 5; 325 MG/1; MG/1
1 TABLET ORAL ONCE
Status: COMPLETED | OUTPATIENT
Start: 2020-09-16 | End: 2020-09-16

## 2020-09-16 RX ORDER — SODIUM CHLORIDE 9 MG/ML
INJECTION, SOLUTION INTRAVENOUS CONTINUOUS PRN
Status: DISCONTINUED | OUTPATIENT
Start: 2020-09-16 | End: 2020-09-16 | Stop reason: SDUPTHER

## 2020-09-16 RX ORDER — OXYCODONE HYDROCHLORIDE AND ACETAMINOPHEN 5; 325 MG/1; MG/1
1 TABLET ORAL EVERY 6 HOURS PRN
Qty: 28 TABLET | Refills: 0 | Status: SHIPPED | OUTPATIENT
Start: 2020-09-16 | End: 2020-09-23

## 2020-09-16 RX ORDER — SODIUM CHLORIDE 0.9 % (FLUSH) 0.9 %
10 SYRINGE (ML) INJECTION EVERY 12 HOURS SCHEDULED
Status: DISCONTINUED | OUTPATIENT
Start: 2020-09-16 | End: 2020-09-16 | Stop reason: HOSPADM

## 2020-09-16 RX ADMIN — HYDROMORPHONE HYDROCHLORIDE 0.5 MG: 1 INJECTION, SOLUTION INTRAMUSCULAR; INTRAVENOUS; SUBCUTANEOUS at 10:28

## 2020-09-16 RX ADMIN — PROPOFOL 150 MG: 10 INJECTION, EMULSION INTRAVENOUS at 09:11

## 2020-09-16 RX ADMIN — PROMETHAZINE HYDROCHLORIDE 12.5 MG: 25 INJECTION INTRAMUSCULAR; INTRAVENOUS at 09:13

## 2020-09-16 RX ADMIN — FENTANYL CITRATE 50 MCG: 50 INJECTION, SOLUTION INTRAMUSCULAR; INTRAVENOUS at 09:11

## 2020-09-16 RX ADMIN — SODIUM CHLORIDE: 9 INJECTION, SOLUTION INTRAVENOUS at 09:51

## 2020-09-16 RX ADMIN — OXYCODONE HYDROCHLORIDE AND ACETAMINOPHEN 1 TABLET: 5; 325 TABLET ORAL at 11:31

## 2020-09-16 RX ADMIN — MIDAZOLAM 2 MG: 1 INJECTION INTRAMUSCULAR; INTRAVENOUS at 09:01

## 2020-09-16 RX ADMIN — ONDANSETRON 4 MG: 2 INJECTION INTRAMUSCULAR; INTRAVENOUS at 09:53

## 2020-09-16 RX ADMIN — SODIUM CHLORIDE: 9 INJECTION, SOLUTION INTRAVENOUS at 09:01

## 2020-09-16 RX ADMIN — SODIUM CHLORIDE: 9 INJECTION, SOLUTION INTRAVENOUS at 08:10

## 2020-09-16 RX ADMIN — LIDOCAINE HYDROCHLORIDE 100 MG: 20 INJECTION, SOLUTION EPIDURAL; INFILTRATION; INTRACAUDAL; PERINEURAL at 09:11

## 2020-09-16 RX ADMIN — HYDROMORPHONE HYDROCHLORIDE 0.5 MG: 1 INJECTION, SOLUTION INTRAMUSCULAR; INTRAVENOUS; SUBCUTANEOUS at 11:01

## 2020-09-16 RX ADMIN — Medication 2 G: at 09:03

## 2020-09-16 RX ADMIN — PROPOFOL 50 MG: 10 INJECTION, EMULSION INTRAVENOUS at 09:15

## 2020-09-16 RX ADMIN — DEXAMETHASONE SODIUM PHOSPHATE 10 MG: 4 INJECTION, SOLUTION INTRAMUSCULAR; INTRAVENOUS at 09:13

## 2020-09-16 ASSESSMENT — PULMONARY FUNCTION TESTS
PIF_VALUE: 2
PIF_VALUE: 16
PIF_VALUE: 13
PIF_VALUE: 0
PIF_VALUE: 13
PIF_VALUE: 15
PIF_VALUE: 14
PIF_VALUE: 15
PIF_VALUE: 13
PIF_VALUE: 15
PIF_VALUE: 2
PIF_VALUE: 13
PIF_VALUE: 8
PIF_VALUE: 0
PIF_VALUE: 3
PIF_VALUE: 15
PIF_VALUE: 3
PIF_VALUE: 16
PIF_VALUE: 15
PIF_VALUE: 13
PIF_VALUE: 15
PIF_VALUE: 13
PIF_VALUE: 3
PIF_VALUE: 15
PIF_VALUE: 13
PIF_VALUE: 15
PIF_VALUE: 13
PIF_VALUE: 6
PIF_VALUE: 15
PIF_VALUE: 2
PIF_VALUE: 15
PIF_VALUE: 13
PIF_VALUE: 13
PIF_VALUE: 15
PIF_VALUE: 8
PIF_VALUE: 15
PIF_VALUE: 3
PIF_VALUE: 2
PIF_VALUE: 10
PIF_VALUE: 3
PIF_VALUE: 16
PIF_VALUE: 15
PIF_VALUE: 1
PIF_VALUE: 2
PIF_VALUE: 15
PIF_VALUE: 15
PIF_VALUE: 10
PIF_VALUE: 3
PIF_VALUE: 15
PIF_VALUE: 15
PIF_VALUE: 13
PIF_VALUE: 19
PIF_VALUE: 13
PIF_VALUE: 16
PIF_VALUE: 15
PIF_VALUE: 13

## 2020-09-16 ASSESSMENT — PAIN DESCRIPTION - PAIN TYPE
TYPE: SURGICAL PAIN

## 2020-09-16 ASSESSMENT — PAIN DESCRIPTION - ORIENTATION
ORIENTATION: LEFT

## 2020-09-16 ASSESSMENT — PAIN DESCRIPTION - PROGRESSION
CLINICAL_PROGRESSION: GRADUALLY IMPROVING
CLINICAL_PROGRESSION: GRADUALLY WORSENING
CLINICAL_PROGRESSION: RAPIDLY IMPROVING

## 2020-09-16 ASSESSMENT — PAIN SCALES - GENERAL
PAINLEVEL_OUTOF10: 8
PAINLEVEL_OUTOF10: 8
PAINLEVEL_OUTOF10: 9

## 2020-09-16 ASSESSMENT — PAIN DESCRIPTION - ONSET
ONSET: ON-GOING
ONSET: PROGRESSIVE

## 2020-09-16 ASSESSMENT — PAIN DESCRIPTION - LOCATION
LOCATION: HAND
LOCATION: HAND

## 2020-09-16 ASSESSMENT — PAIN DESCRIPTION - DESCRIPTORS
DESCRIPTORS: ACHING;DISCOMFORT
DESCRIPTORS: ACHING;DISCOMFORT

## 2020-09-16 ASSESSMENT — PAIN SCALES - WONG BAKER: WONGBAKER_NUMERICALRESPONSE: 4

## 2020-09-16 ASSESSMENT — PAIN - FUNCTIONAL ASSESSMENT: PAIN_FUNCTIONAL_ASSESSMENT: 0-10

## 2020-09-16 NOTE — H&P
Department of Orthopedic Surgery  History and Physical        CHIEF COMPLAINT:  Left thumb pain     HISTORY OF PRESENT ILLNESS:                 The patient is a RHD 62 y.o. female who presents with left thumb pain. Patient has had left thumb pain for the last 2 years. She has had 3 cortisone injections to the left thumb CMC joint. Following the injection she would have issues with fat atrophy. She was getting at least 6 months of pain relief with the injections. She reports good results with the previous injection however her symptoms have returned. She now has about a month of symptoms related to the lateral epicondylar region. She reports this is mostly while lifting weights. She reports no significant injuries.     Past Medical History:    Past Medical History             Diagnosis Date    Left ankle instability       for or 10/21/2016    PONV (postoperative nausea and vomiting)       severe immediately per pt    Raynaud's disease      Skin cancer       neck/shoulder left    Wears glasses          Past Surgical History:    Past Surgical History             Procedure Laterality Date    ANKLE SURGERY   10/21/2016     stabilization left ankle    APPENDECTOMY        COLONOSCOPY   12/21/2017     normal    SKIN BIOPSY        TONSILLECTOMY            Current Medications:   Current Hospital Medications   No current facility-administered medications for this visit. Allergies:  Anesthesia s-i-60     Social History:   TOBACCO:   reports that she has never smoked. She has never used smokeless tobacco.  ETOH:   reports current alcohol use of about 2.0 standard drinks of alcohol per week. DRUGS:   reports no history of drug use.   ACTIVITIES OF DAILY LIVING:    OCCUPATION:    Family History:   Family History             Problem Relation Age of Onset    Stroke Father      ADHD Brother           Adopted           REVIEW OF SYSTEMS:  CONSTITUTIONAL:  negative  EYES:  negative  HEENT: negative  RESPIRATORY:  negative  CARDIOVASCULAR:  negative  GASTROINTESTINAL:  negative  GENITOURINARY:  negative  INTEGUMENT/BREAST:  negative  HEMATOLOGIC/LYMPHATIC:  negative  ALLERGIC/IMMUNOLOGIC:  negative  ENDOCRINE:  negative  MUSCULOSKELETAL:  pain  NEUROLOGICAL:  negative  BEHAVIOR/PSYCH:  negative     PHYSICAL EXAM:    VITALS:  Temp 97.7 °F (36.5 °C) (Infrared)   Resp 18   Ht 5' 6\" (1.676 m)   Wt 132 lb (59.9 kg)   LMP  (LMP Unknown)   BMI 21.31 kg/m²   CONSTITUTIONAL:  awake, alert, cooperative, no apparent distress, and appears stated age  EYES:  Lids and lashes normal, pupils equal, round and reactive to light, extra ocular muscles intact, sclera clear, conjunctiva normal  ENT:  Normocephalic, without obvious abnormality, atraumatic, sinuses nontender on palpation, external ears without lesions, oral pharynx with moist mucus membranes, tonsils without erythema or exudates, gums normal and good dentition. NECK:  Supple, symmetrical, trachea midline, no adenopathy, thyroid symmetric, not enlarged and no tenderness, skin normal  RESP:CTA  CV:RRR  ABD:soft,nttp  NEUROLOGIC:  Awake, alert, oriented to name, place and time. Cranial nerves II-XII are grossly intact. Motor is 5 out of 5 bilaterally. Sensory is intact.  gait is normal.  MUSCULOSKELETAL:     Left upper extremity: Nontender about the shoulder with good range of motion. Positive point tenderness over the lateral epicondyle. Full elbow flexion extension. Nontender of the medial epicondyle. Positive pain with resisted wrist extension at the elbow. Negative Tinel's of the cubital tunnel, positive Tinel's of the carpal tunnel, negative Ladan's, negative Finkelstein's, positive CMC grind, positive atrophy over the [facility] joint of the thumb. Negative tenderness over the A1 pulleys with no active triggering. Full flexion-extension of the fingers. Negative Wartenberg's and cross finger testing. APB strength 5 out of 5.   Median, ulnar, radial nerves intact light touch. Brisk capillary refill. Gross motor 5     DATA:    CBC:         Lab Results   Component Value Date     WBC 5.1 01/24/2012     RBC 4.20 01/24/2012     HGB 13.6 01/24/2012     HCT 39.2 01/24/2012     MCV 93.4 01/24/2012     MCH 32.4 01/24/2012     MCHC 34.7 01/24/2012     RDW 13.0 01/24/2012      01/24/2012     MPV 9.9 01/24/2012     PT/INR:  No results found for: PROTIME, INR     Radiology Review: X-rays of the left hand were  reviewed with patient. 4 views: AP, lateral, oblique, dedicated thumb view demonstrate Hollis Parks stage III thumb CMC joint arthritis. No acute fractures or dislocations. Impression: Left thumb CMC joint arthritis     X-rays of the left elbow AP and lateral views were obtained today in the office. Negative for acute fractures or dislocations. Soft tissues within normal limits. 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 Explained Stablyx [facility] arthroplasty with the patient in detail. All questions were answered. She is interested in proceeding with left thumb Stablyx arthroplasty. In addition discussed lateral condyle-itis. Counterforce brace and home exercise program was provided. She is already taking anti-inflammatories. All questions answered.       I explained the risks, benefits, alternatives and complications of surgery with the patient including but not limited to the risks of infection, possible damage to nerves, vessels, or tendons, stiffness, loss of range of motion, scar sensitivity, wound healing complications, periprosthetic fractures and dislocations, worsening symptoms, possible need for therapy, as well as the possible need further surgery and unanticipated complications. The patient voiced understanding and all questions were answered.  The patient elected to proceed with surgical intervention.

## 2020-09-16 NOTE — OP NOTE
Operative Note      Patient: Elodia Dill  YOB: 1960  MRN: [id number]    Date of Procedure: 9/16/2020    Pre-Op Diagnosis: LEFT THUMB CARPOMETACARPAL ARTHRITIS    Post-Op Diagnosis: Same       Procedure(s):  LEFT THUMB ARTHROPLASTY   ++STABLYX++    Surgeon(s):  Leo Egan MD    Assistant:   Physician Assistant: LI Padron  Resident: Katina Xavier DO    Anesthesia: General    Estimated Blood Loss (mL): Minimal    Complications: None    Specimens:   ID Type Source Tests Collected by Time Destination   A : LEFT THUMB BONE Bone Bone SURGICAL PATHOLOGY Leo Egan MD 9/16/2020 9498        Implants:  Implant Name Type Inv. Item Serial No.  Lot No. LRB No. Used Action   STABLYX, Aia 16 IMPLANT    [employer] [id number] Left 1 Implanted         Drains: * No LDAs found *    Findings: see details    Detailed Description of Procedure:   [address] [address]                                 OPERATIVE REPORT           PROVIDER:     Bhupinder Nava MD     DATE OF PROCEDURE: 9/16/2020         POSTOPERATIVE DIAGNOSIS:  Left thumb carpometacarpal arthritis. POSTOPERATIVE DIAGNOSIS:  Left  thumb carpometacarpal arthritis. PROCEDURE PERFORMED:  Left thumb carpometacarpal arthroplasty using the  [employer] Stablyx size 3 implant. ANESTHESIA:  1. General.  2.  Local anesthetic by surgeon consisting of approximately 10 mL of  0.25% Marcaine with epinephrine. ASSISTANT:  1.  Dr Issa Fink, orthopedic surgery resident. 2.  Cintia Jovel, physician assistant certified. She was present  throughout the procedure. Her assistance was used for preoperative  positioning, intraoperative retraction, closure, and dressing  application. Her assistance expedited the case and decreased the  surgical time. TOTAL TOURNIQUET TIME:  Approximately 30 minutes.      ESTIMATED BLOOD LOSS: Minimal.     FINDINGS:  1.  End-stage thumb carpometacarpal arthritis was present. 2.  Status post arthroplasty with the [employer] Stablyx size 3  implant, excellent stability was restored with full range of motion  achieved including full opposition and retropulsion and radial abduction  under fluoroscopic exam.     COMPLICATIONS:  None. DISPOSITION:  The patient was stable throughout the procedure. OPERATIVE INDICATION:  The patient presents with  persistent recalcitrant  left thumb basal joint arthritis. After  failing conservative management, they wished to proceed with surgical  intervention. Risks, benefits, alternatives, and complications were  fully explained including, but not limited to the risks of infection;  damage to nerves, vessels, or tendons; failure to improve symptoms;  worsening symptoms; progressive arthritis; stiffness; loss of range of  motion; carpal instability; dislocations; implant failure;  periprosthetic fractures. They understood and wished to proceed. SURGERY IN DETAIL:  The patient was identified in the holding area. The  left thumb was identified as the surgical site, was then seen by  Anesthesia, taken to the operating room, placed supine on the table,  underwent general anesthesia per Anesthesia Department. All bony  prominences were well padded. A well-padded arm tourniquet was placed. The right upper extremity was prepared and draped in standard sterile  fashion. Arm was exsanguinated. Tourniquet inflated to 250 mmHg. Fluoroscopy was then brought in.  AP, lateral, and pronated views of the  thumb carpometacarpal joint confirmed arthritis. An incision directly centered over the dorsal aspect of the thumb  carpometacarpal articulation was then created longitudinally for a total  length of approximately 6 cm followed by blunt dissection and  identification and preservation of radial sensory nerve branches, which  were retracted.   The first in alignment to the implant. This was marked on the thumb  metacarpal for good positioning. With this completed, the proper size  implant was trialed. Fluoroscopic imaging was brought in, used to  confirm excellent alignment with good contouring and excursion with  radial ulnar deviation and flexion extension under fluoroscopic imaging. With this completed, the trial was removed. The wound was thoroughly  and copiously irrigated out. The size size 3 implant was impacted  ensuring the keel was well seated and final impaction with good  stability to the implant. The joint was then reduced. Fluoroscopic  imaging, AP and lateral views, of the thumb carpometacarpal articulation  confirmed excellent alignment and similar excursion as the trial.  With  this confirmed, the wound was again copiously irrigated out. The dorsal  capsular tissue was repaired with 0 Vicryl suture. Tourniquet was  deflated. The radial artery was intact and patent and good hemostasis  achieved. Skin closed with 2-0 Vicryl and 3-0 Monocryl. Mastisol,  Steri-Strips, sterile dressing, and splint was applied.            Raquel Brandt MD      Electronically signed by Margret Castillo MD on 9/16/2020 at 10:15 AM

## 2020-09-16 NOTE — ANESTHESIA POSTPROCEDURE EVALUATION
Department of Anesthesiology  Postprocedure Note    Patient: Galo Meek  MRN: [id number]  YOB: 1960  Date of evaluation: 9/16/2020  Time:  12:35 PM     Procedure Summary     Date:  09/16/20 Room / Location:  [facility] OR 02 / [facility]    Anesthesia Start:  0656 Anesthesia Stop:  0966    Procedure:  LEFT THUMB ARTHROPLASTY   ++STABLYX++ (Left Thumb) Diagnosis:  (LEFT THUMB CARPOMETACARPAL ARTHRITIS)    Surgeon:  Chitra Alcantar MD Responsible Provider:  Franklin Lee DO    Anesthesia Type:  general ASA Status:  2          Anesthesia Type: general    Cari Phase I: Cari Score: 10    Cari Phase II: Cari Score: 10    Last vitals: Reviewed and per EMR flowsheets.        Anesthesia Post Evaluation    Patient location during evaluation: PACU  Patient participation: complete - patient participated  Level of consciousness: awake and alert  Airway patency: patent  Nausea & Vomiting: no nausea and no vomiting  Complications: no  Cardiovascular status: hemodynamically stable  Respiratory status: acceptable  Hydration status: euvolemic

## 2020-09-16 NOTE — PROGRESS NOTES
CLINICAL PHARMACY NOTE: MEDS TO [address] Select Patient?: Yes  Total # of Prescriptions Filled: 2   The following medications were delivered to the patient:  · Percocet 5/325 mg  · Promethazine 25 mg    Total # of Interventions Completed: 2  Time Spent (min): 30    Additional Documentation:

## 2020-09-16 NOTE — ANESTHESIA PRE PROCEDURE
Department of Anesthesiology  Preprocedure Note       Name:  Muna Jauregui   Age:  62 y.o.  :  1960                                          MRN:  [id number]         Date:  2020      Surgeon: Shirley Contreras):  Jarrell Victor MD    Procedure: Procedure(s):  LEFT THUMB ARTHROPLASTY   ++STABLYX++    Medications prior to admission:   Prior to Admission medications    Medication Sig Start Date End Date Taking?  Authorizing Provider   naproxen (NAPROSYN) 250 MG tablet Take 250 mg by mouth 2 times daily (with meals)   Yes Historical Provider, MD   TURMERIC PO Take by mouth daily   Yes Historical Provider, MD   Probiotic Product (PROBIOTIC DAILY PO) Take by mouth daily   Yes Historical Provider, MD   melatonin 3 MG TABS tablet Take 3 mg by mouth nightly as needed   Yes Historical Provider, MD   Omega-3 Fatty Acids (OMEGA 3 PO) Take by mouth daily Ld 12/15   Yes Historical Provider, MD   Calcium-Magnesium-Vitamin D (CALCIUM 500 PO) Take 1 tablet by mouth 2 times daily Ld 12/15/2017 all vitamins and supplements   Yes Historical Provider, MD   Multiple Vitamins-Minerals (THERAPEUTIC MULTIVITAMIN-MINERALS) tablet Take 1 tablet by mouth daily Ld 12/15   Yes Historical Provider, MD       Current medications:    Current Facility-Administered Medications   Medication Dose Route Frequency Provider Last Rate Last Dose    0.9 % sodium chloride infusion   Intravenous Continuous LI Rice 125 mL/hr at 20 0810      ceFAZolin (ANCEF) 2 g in sterile water 20 mL IV syringe  2 g Intravenous On Call to [address]        sodium chloride flush 0.9 % injection 10 mL  10 mL Intravenous 2 times per day LI Rice        sodium chloride flush 0.9 % injection 10 mL  10 mL Intravenous PRN LI Rice        scopolamine (TRANSDERM-SCOP) transdermal patch 1 patch  1 patch Transdermal Once Sander Fat, DO        cefazolin 2 g in 20 mL SWFI IV Syringe IV syringe             scopolamine (TRANSDERM-SCOP) 1 MG/3DAYS transdermal patch                Allergies: Allergies   Allergen Reactions    Anesthesia S-I-60 Nausea And Vomiting     Severe nausea and vomiting with anesthesia (drug name unknown to patient)        Problem List:    Patient Active Problem List   Diagnosis Code    Arthritis of carpometacarpal Sharkey) joint of left thumb M18.12    Carpal tunnel syndrome of right wrist G56.01       Past Medical History:        Diagnosis Date    Arthritis     left thumb    Chronic pain of left thumb 2018    for or 9-16-20    PONV (postoperative nausea and vomiting)     severe immediately per pt after general anesthesia    Raynaud's disease     Skin cancer 2017    neck/shoulder left    Wears glasses        Past Surgical History:        Procedure Laterality Date    ANKLE SURGERY  10/21/2016    stabilization left ankle    APPENDECTOMY      COLONOSCOPY  12/21/2017    normal    SKIN BIOPSY      TONSILLECTOMY         Social History:    Social History     Tobacco Use    Smoking status: Never Smoker    Smokeless tobacco: Never Used   Substance Use Topics    Alcohol use:  Yes     Alcohol/week: 2.0 standard drinks     Types: 2 Glasses of wine per week     Comment: occassionally                                Counseling given: Not Answered      Vital Signs (Current):   Vitals:    09/14/20 1210 09/16/20 0759   BP:  (!) 161/85   Pulse:  85   Resp:  18   Temp:  97.3 °F (36.3 °C)   TempSrc:  Temporal   SpO2:  97%   Weight: 135 lb (61.2 kg) 135 lb (61.2 kg)   Height: 5' 6\" (1.676 m) 5' 6\" (1.676 m)                                              BP Readings from Last 3 Encounters:   09/16/20 (!) 161/85   03/02/20 (!) 148/79   07/10/19 122/74       NPO Status: Time of last liquid consumption: 2030                        Time of last solid consumption: 2030                        Date of last liquid consumption: 09/15/20                        Date of last solid food consumption: 09/15/20    BMI:   Wt Readings from Last 3 Encounters:   09/16/20 135 lb (61.2 kg)   06/01/20 132 lb (59.9 kg)   03/02/20 133 lb (60.3 kg)     Body mass index is 21.79 kg/m². CBC:   Lab Results   Component Value Date    WBC 5.1 01/24/2012    RBC 4.20 01/24/2012    HGB 13.6 01/24/2012    HCT 39.2 01/24/2012    MCV 93.4 01/24/2012    RDW 13.0 01/24/2012     01/24/2012       CMP:   Lab Results   Component Value Date     01/24/2012    K 4.8 01/24/2012     01/24/2012    CO2 31 01/24/2012    BUN 15 01/24/2012    CREATININE 0.8 01/24/2012    LABGLOM >60 01/24/2012    GLUCOSE 86 03/01/2018    GLUCOSE 119 01/24/2012    PROT 7.0 01/24/2012    CALCIUM 9.9 01/24/2012    BILITOT 0.6 01/24/2012    ALKPHOS 55 01/24/2012    AST 25 01/24/2012    ALT 20 01/24/2012       POC Tests: No results for input(s): POCGLU, POCNA, POCK, POCCL, POCBUN, POCHEMO, POCHCT in the last 72 hours. Coags: No results found for: PROTIME, INR, APTT    HCG (If Applicable):   Lab Results   Component Value Date    PREGTESTUR NEGATIVE 12/01/2010        ABGs: No results found for: PHART, PO2ART, QVY5IZP, HZH0KBW, BEART, B4ITIKRB     Type & Screen (If Applicable):  No results found for: LABABO, LABRH    Drug/Infectious Status (If Applicable):  No results found for: HIV, HEPCAB    COVID-19 Screening (If Applicable):   Lab Results   Component Value Date    COVID19 Not Detected 09/11/2020         Anesthesia Evaluation  Patient summary reviewed   history of anesthetic complications: PONV.   Airway: Mallampati: III  TM distance: >3 FB   Neck ROM: full  Mouth opening: > = 3 FB Dental: normal exam         Pulmonary: breath sounds clear to auscultation                             Cardiovascular:Negative CV ROS            Rhythm: regular             Beta Blocker:  Not on Beta Blocker         Neuro/Psych:   (+) neuromuscular disease:,              ROS comment: Raynaud's disease   GI/Hepatic/Renal: Neg GI/Hepatic/Renal ROS            Endo/Other:    (+) : arthritis:., .                 Abdominal:           Vascular: negative vascular ROS. Anesthesia Plan      general     ASA 2     (SCOP PATCH ORDERED AND PLACED. WILL ALSO GIVE PHENERGAN INTRA-OP)  Induction: intravenous. MIPS: Postoperative opioids intended and Prophylactic antiemetics administered. Anesthetic plan and risks discussed with patient. Plan discussed with CRNA.           [address],    9/16/2020

## 2020-09-16 NOTE — BRIEF OP NOTE
Brief Postoperative Note      Patient: Theresa Hardy  YOB: 1960  MRN: [id number]    Date of Procedure: 9/16/2020    Pre-Op Diagnosis: LEFT THUMB CARPOMETACARPAL ARTHRITIS    Post-Op Diagnosis: Same       Procedure(s):  LEFT THUMB ARTHROPLASTY   ++STABLYX++    Surgeon(s):  Darline Martinez MD    Assistant:  Physician Assistant: LI Moore  Resident: Kelly Boone DO    Anesthesia: General    Estimated Blood Loss (mL): Minimal    Complications: None    Specimens:   ID Type Source Tests Collected by Time Destination   A : LEFT THUMB BONE Bone Bone SURGICAL PATHOLOGY Darline Martinez MD 9/16/2020 3632            Electronically signed by Kelly Boone DO on 9/16/2020 at 9:35 AM

## 2020-09-16 NOTE — BRIEF OP NOTE
Brief Postoperative Note      Patient: Zenobia Molina  YOB: 1960  MRN: [id number]    Date of Procedure: 9/16/2020    Pre-Op Diagnosis: LEFT THUMB CARPOMETACARPAL ARTHRITIS    Post-Op Diagnosis: Same       Procedure(s):  LEFT THUMB ARTHROPLASTY   ++STABLYX++    Surgeon(s):  Jen Espinosa MD    Assistant:  Physician Assistant: LI Torres  Resident: Fatou Joe DO    Anesthesia: General    Estimated Blood Loss (mL): Minimal    Complications: None    Specimens:   ID Type Source Tests Collected by Time Destination   A : LEFT THUMB BONE Bone Bone SURGICAL PATHOLOGY Jen Espinosa MD 9/16/2020 0503        Implants:  Implant Name Type Inv.  Item Serial No.  Lot No. LRB No. Used Action   STABLYX, Aia 16 IMPLANT    [employer] [id number] Left 1 Implanted         Drains: * No LDAs found *    Findings: see op note    Electronically signed by LI Torres on 9/16/2020 at 10:00 AM

## 2020-09-18 ENCOUNTER — TELEPHONE (OUTPATIENT)
Dept: ORTHOPEDIC SURGERY | Age: 60
End: 2020-09-18

## 2020-09-24 ENCOUNTER — TREATMENT (OUTPATIENT)
Dept: OCCUPATIONAL THERAPY | Age: 60
End: 2020-09-24
Payer: COMMERCIAL

## 2020-09-24 ENCOUNTER — OFFICE VISIT (OUTPATIENT)
Dept: ORTHOPEDIC SURGERY | Age: 60
End: 2020-09-24

## 2020-09-24 VITALS — BODY MASS INDEX: 21.69 KG/M2 | HEIGHT: 66 IN | WEIGHT: 135 LBS | TEMPERATURE: 97.5 F

## 2020-09-24 PROCEDURE — 99024 POSTOP FOLLOW-UP VISIT: CPT | Performed by: ORTHOPAEDIC SURGERY

## 2020-09-24 PROCEDURE — 97760 ORTHOTIC MGMT&TRAING 1ST ENC: CPT | Performed by: OCCUPATIONAL THERAPIST

## 2020-09-24 RX ORDER — IBUPROFEN 200 MG
200 TABLET ORAL EVERY 6 HOURS PRN
COMMUNITY

## 2020-09-24 NOTE — PROGRESS NOTES
HPI: Patient presents today POD #8 s/p left thumb arthroplasty. Overall the patient is doing well. Physical Exam: Incision clean dry intact with Steri-Strips in place. Mild swelling. Positive ecchymosis down the arm. Compartments soft and compressible. Full flexion-extension of the index, middle, ring and small fingers. Active IP flexion extension of the thumb. Median, ulnar, radial nerves intact light touch. Brisk capillary refill. Otherwise neurovascular intact    X-rays of the left thumb were obtained today in the office and reviewed with patient. 3 views: AP, lateral, oblique demonstrate stable thumb arthroplasty with no interval changes in alignment when compared to intraoperative fluoroscopy. Impression: stable left thumb arthroplasty    Assessment: POD #8 s/p left thumb arthroplasty    Plan:   Patient referred to therapy for splint fabrication. Scar management advised. Activity restrictions reviewed with the patient. Patient to continue with therapy for thumb arthroplasty protocol. Follow up in 1 month with repeat x-rays  All questions and concerns answered. I have seen and evaluated the patient and agree with the above assessment and plan on today's visit. I have performed the key components of the history and physical examination with significant findings of postop left thumb Stablyx arthroplasty. . I concur with the findings and plan as documented.     Margret Castillo MD  9/24/2020

## 2020-09-24 NOTE — PROGRESS NOTES
OCCUPATIONAL THERAPY EVALUATION/Splint Application Only   Phone: [phone]   Fax: [phone]     Date:  2020      Patient Name:  Daisy Zamora    :  1960    Restrictions/Precautions: Follow [facility] arthroplasty Stablyx protocol  Diagnosis:  L [facility] OA,  [facility] arthroplasty Stablyx        Treatment Diagnosis:  same  Insurance/Certification information:  Medical [place]   Referring Physician:  Dr. Nat Buckner  Date of Surgery/Injury: sx   2020  Plan of care signed (Y/N):  n  Visit# / total visits:   ( is scheduled for an OT evaluation next week)    Past Medical History:   Past Medical History:   Diagnosis Date    Arthritis     left thumb    Chronic pain of left thumb 2018    for or 20    PONV (postoperative nausea and vomiting)     severe immediately per pt after general anesthesia    Raynaud's disease     Skin cancer 2017    neck/shoulder left    Wears glasses      Past Surgical History:   Past Surgical History:   Procedure Laterality Date    ANKLE SURGERY  10/21/2016    stabilization left ankle    APPENDECTOMY      ARTHROPLASTY Left 2020    LEFT THUMB ARTHROPLASTY   ++STABLYX++ performed by Mark Brown MD at [address] [place] [address]  2017    normal    SKIN BIOPSY      TONSILLECTOMY         Reason for Referral: Pt is seeing the Dr. Forman for her first post -op appointment from her sx on 2020. She had a Aia 16 arthroplasty on 2020 - Stablyx procedure. She saw the Dr forman for stitch removal and is seeing OT for splint fabrication. Splint Fabricated/Provided: A wrist , thumb spica splint allowing thumb IP motion and finger motion only. Education Provided: Patient was provided with verbal education/handout regarding splint care, wear, precautions, and hygiene    Splint Care: Splint can be washed with mild soap and cool water. Splint needs to air dry. Avoid leaving splint in or near a source of heat such as a hot car or a space heater.  Use nail polish remover to remove stains on splint. Use Purell will decrease any odor that may develop. Splint Precuations: Patient was instructed to remove splint and contact therapist if the following occur:   1. Redness that lasts longer that 15-20 mins   2. Increased swelling   3. Increased pain   4. Pressure Sores    Wear Schedule: To wear 24/7. She can remove for showering ( not using her hand) and remove a few times a day to air out when sitting doing nothing. Splint Charge : splint fabrication x/s 2  Profile and History- history from pt and physician notes. Rehab Potential: Good   Recommendations: Outpatient OT  Goal Formulation: Patient  Requires OT Follow Up: Yes    Plan   Plan: Plan of care initiated. Pt was seen today for splint only. She was told to contact this department if splint revisions are needed before her OT evaluation. Pt is scheduled for her OT evaluation next week. Goals (1 session):  1. Patient will verbalize and demo ability to don/doff splint appropriately in 1 session with good accuracy   Goal Met: YES   2. Patient will verbalize understanding of splint precautions, splint care, and wear schedule in 1 session   Goal Met: YES  3. Patient will demonstrate understand of stretching and/or exercise provided in 1 session. Goal Met: YES. Pt shown how to do blocked thumb  IP flex/ext and keep her fingers moving. Please review Patient's OT evaluation and if you agree sign/date and fax back to us at our [address] [facility]  fax # [phone].     Total Tx Time: 30 min     Physician's Signature:____________________________ Date:__________________       Keri Hernández OT/L, CHT

## 2020-09-28 ENCOUNTER — EVALUATION (OUTPATIENT)
Dept: OCCUPATIONAL THERAPY | Age: 60
End: 2020-09-28
Payer: COMMERCIAL

## 2020-09-28 PROCEDURE — 97165 OT EVAL LOW COMPLEX 30 MIN: CPT | Performed by: OCCUPATIONAL THERAPIST

## 2020-09-28 PROCEDURE — 97110 THERAPEUTIC EXERCISES: CPT | Performed by: OCCUPATIONAL THERAPIST

## 2020-09-28 NOTE — PROGRESS NOTES
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 ADL STATUS:    Ind      Mod I     Min A  Mod A  Max A  Dep  N/A  Feeding:  [x]   []   []   []   []   []  []  Grooming:  [x]   []   []   []   []   []  []  Bathing:  [x]   []   []   []   []   []  []  UE Dressing:  [x]   []   []   []   []   []  []  LE Dressing:  [x]   []   []   []   []   []  []  Toileting:  [x]   []   []   []   []   []  []  Transfer:  [x]   []   []   []   []   []  []  Comments: Unable to complete snaps, zippers, difficulty opening containers, using LUE is difficulty and is avoided if able. Pain Level: 2 on scale of 1-10, aching, throbbing and with paresthesia  In the end of the thumb    UE Assessment:  Pt presented to evaluation with thumb spica brace donned. Pt demonstrates moderate edema and severe bruising from thumb IP down forearm to the elbow. Pt reports pain in forearm and not in thumb. Incision site was dry with steri strips donned and no drainage noted. Pt did report using L digits and attempting activities using L hand. Pt demonstrates decreased ROM, moderate/severe bruising and edema, pain reported in forearm and decreased strength/endurance. Pt would benefit from skilled OT services to improve functional use of LUE by increasing ROM, strength, endurance and decreasing pain, edema. COVID-19 Screening completed upon entering facility and patient cleared for treatment today. Pt followed all protocols set forth by [facility] for Outpatient services throughout therapy session. Patient has been made aware of all new hygiene protocols due to COVID-19 set forth by [facility] Outpatient services and agrees to abide by all protocols.        L Hand ROM  Edema AROM [x]         PROM[] IE      THUMB             7.8cm MP Flexion/Extension 0-50*/ 14-23* H 57*/0*      6.5 cm IP Flexion/Extension 0-80*/ 23-30* H 14*/0*  Arthritic bump on tip of IP       Radial Abduction 53-71* TBA       Palmar Abduction 50-71* TBA        Comment: Hand Dominance is R    Edema Description/Circumferential Measurements:   L starting at ulnar head : 16.5 cm    R starting at ulnar head : 15.6 cm  Dynamometer (setting 2):     Left: TBA      Right: TBA    Pinch Meter:   Lateral: Left= TBA,Right= TBA    Palmar 3 point: Left= TBA, Right= TBA  9 Hole Peg Test:   Left: TBA   Right: TBA    QuickDASH Score: 81.8% disability reported      Intervention: MHP 10 mins, education provided and HEP issued for Thumb IP blocking flexion/extension. Educated pt on Thumb MP blocking however instructed to start this exercise on Wed. Kinesio tape application completed to forearm and thumb ( 2 web strips long crossed) to assist with pain, edema and decreasing bruising. Pt demonstrated IP exercises well.      Assessment of current deficits   Functional mobility []  ADLs [] Strength [x]  Cognition []  Functional transfers  [] IADLs [] Safety Awareness [x]  Endurance [x]  Fine Motor Coordination [x] Balance [] Vision/perception [] Sensation []   Gross Motor Coordination [x] ROM [x]     Eval Complexity: low  Profile and History- Chart reviewed  Assessment of Occupational Performance and Identification of Deficits- 6   Clinical Decision Making- no additional modifications required    Rehab Potential:                                 [x] Good  [] Fair  [] Poor        Suggested Professional Referral:       [x] No  [] Yes:  Barriers to Goal Achievement[de-identified]          [x] No  [] Yes:  Domestic Concerns:                           [x] No  [] Yes:     Goal Formulation: Patient  Time In: 09:00 am           Time Out: 10:00 am                      Timed Code Treatment Minutes: 30 minutes        PLAN      Treatment to include:   [x] Instruction in HEP                   Modalities:  [x] Therapeutic Exercise        [x] Ultrasound               [x] Electrical Stimulation/Attended  [x] PROM/Stretching                    [x] Fluidotherapy          [x]  Paraffin                   [x] AAROM  [x] AROM                 [x] Iontophoresis: 4 mg/mL; Dexamethasone Sodium                                        [] Neuromuscular Re-education [x] Splinting         [x] Desensitization          [x] Therapeutic Activity       [x] Pain Management with/without modalities PRN                 [x] Manual Therapy/Fascial release                            [x] Tendon Glides        [x]Joint Protection/Training  [x]Ergonomics                             [x] Joint Mobilization        [] Adaptive Equipment Assessment/Training                             [x] Manual Edema Mobilization    [x] Energy Conservation/Work Simplification  [x] GM/FM Coordination       [x] Safety retraining/education per  individual diagnosis/goals  [x] Scar Management        [] ADL/IADL re-training       Patient Specific Goal: Pt would like full use of LUE without pain                             GOALS (Long term same as Short term):  1) Patient will demonstrate good understanding of home program (exercises/activities/diagnosis/prognosis/goals) with good accuracy. 2) Patient will demonstrate increased active/passive range of motion of their LUE to [facility] for ADL/IADL completion. 3) Patient will demonstrate increased /pinch strength of at least 10 / 5 pinch pounds of their L hand. 4) Patient to report decreased pain in their affected L upper extremity from 6/10 to 2/10 or less with resistive functional use. 5) Increase in fine motor function as evidenced by decreased time to complete 9-hole peg test and/or MRMT test by at least 5-10 seconds. 6) Patient to report 100% compliance with their splint wear, care, and precautions if needed. 7) Patient to report a decrease in hypersensitivity from 60 to 20% or less in their LUE.    8) Patient will be knowledgeable of edema control techniques as evident with decreases from moderate to mild/none. 9) Patient will demonstrate a non-tender/non-adherent scar. 10) Patient will report ADL functions as Mod I/I using LUE. 11) Patient will demonstrate improved functional activity tolerance from poor to fair for ADL/IADL completion. 12) Patient will decrease QuickDASH score by 50% for increased participation in daily functional activities. Patient. Education:  [x] Plans/Goals, Risks/Benefits discussed  [x] Home exercise program  Method of Education: [x] Verbal  [x] Demo  [x] Written  Comprehension of Education:  [x] Verbalizes understanding. [x] Demonstrates understanding. [x] Needs Review. [x] Demonstrates/verbalizes understanding of HEP/Ed previously given. Patient understands diagnosis/prognosis and consents to treatment, plan and goals: [x] Yes    [] No      Electronically signed by: Ceasar Soto MS, OTR/L #[id number]        SHERYLT'SYLVESTER Certification / Comments      Frequency/Duration 1-2x / week for 12-18 visits. Certification period From: 09/28/2020  To: 12/21/2020     I have reviewed the Plan of Care established for skilled therapy services and certify that the services are required and that they will be provided while the patient is under my care. Physician's Comments/Revisions:                   Physicians's Printed Name:  Dr. Sania Garcia                                   Physician's Signature:                                                               Date:       Please review Patient's OT evaluation and if you agree sign/date and fax back to us at our [address] [facility] Fax: [phone].  Thank you for your referral!

## 2020-10-01 ENCOUNTER — TREATMENT (OUTPATIENT)
Dept: OCCUPATIONAL THERAPY | Age: 60
End: 2020-10-01
Payer: COMMERCIAL

## 2020-10-01 PROCEDURE — 97110 THERAPEUTIC EXERCISES: CPT | Performed by: OCCUPATIONAL THERAPIST

## 2020-10-01 PROCEDURE — 97140 MANUAL THERAPY 1/> REGIONS: CPT | Performed by: OCCUPATIONAL THERAPIST

## 2020-10-01 NOTE — PROGRESS NOTES
OCCUPATIONAL THERAPY PROGRESS NOTE    Date:  10/1/2020                  Initial Evaluation Date: 2020    Patient Name:  Latha June    :  1960    Restrictions/Precautions:  Follow Aia 16 arthroplasty Stablyx protocol  Diagnosis:  L Aia 16 OA, [address] arthroplasty Stablyx       M18.12 (ICD-10-CM) - Arthritis of carpometacarpal (Aia 16) joint of left thumb                            Insurance/Certification information:  Medical [place]   Referring Physician:  Dr. Jose Alfredo Alfaro  Date of Surgery/Injury: sx   2020  Plan of care signed (Y/N):  Y  Visit# / total visits:     Pain Level:   0-3/10 at rest and in the brace, with activity 5/10    Subjective: \"My bruising looks better since this tape. I'm not sure I'm doing that new exercise right. \"     Objective:  Updated POC to be completed by visit 10. INTERVENTION: COMPLETED: SPECIFICS/COMMENTS:   Modality:     MHP x For soft tissue warm-up        AROM:     Wrist wiggle  x Stab forearm - gentle wrist flex/ext sm arc ROM   Digital exercises x Flexion, hook fist, finger abb/abdcution   Thumb IP and MP blocked  x Flex/ext - added MP today -pt had 5/10 at the  - [facility]        AAROM:               PROM/Stretching:     IP thumb flexion x         Scar Mass/Edema Control:     Scar masage x Steri strips oss- gentle massasge and lifting of scar. Can do at home   Size D compression sleeve x Fitted with to wear as needed to decrease pain and edema. Strengthening:               Other:     Splint  x Adjusted around the web space to decrease pressure. HEP x Cont  With thumb IP - added MP blocked flex/ext, finger fist, hook and finger add/abduction and wrist wiggle          Assessment/Comments: Pt is making Fair + progress toward stated plan of care. Pt showing good digital motion and thumb IP motion. She did well with taping but it was left off to allow skin to breath and pt did have some redness from the tape.  Pt as fitted with a compression Sleeve instead. Pt  Appears to understand all new instructions.     -Rehab Potential: Good  -Requires OT Follow Up: Yes  Time In:  11:05 am             Time Out: 11:55 pm              Visit #: 2    Treatment Charges: Mins Units   Modalities     Ther Exercise 20 1   Manual Therapy 25 2   Thera Activities     ADL/Home Mgt      Neuro Re-education     Gait Training     Group Therapy     Non-Billable Service Time-MH 5 0   Other     Total Time/Units 50 3       -Response to Treatment: Pt pleased that her L arm is looking better and less bruised and the pian has remained low - occasionally moderate. Goals: Goals for pt can be see on initial eval occurring on 9/28/2020    Plan:   [x]  Continues Plan of care: Treatment covered based on POC and graduated to patient's progress. Pt education continues at each visit to obtain maximum benefits from skilled OT intervention.   []  Alter Plan of care:   []  Discharge:      Mariluz Rodriguez OT/Ll CHT

## 2020-10-05 ENCOUNTER — TREATMENT (OUTPATIENT)
Dept: OCCUPATIONAL THERAPY | Age: 60
End: 2020-10-05
Payer: COMMERCIAL

## 2020-10-05 PROCEDURE — 97140 MANUAL THERAPY 1/> REGIONS: CPT | Performed by: OCCUPATIONAL THERAPIST

## 2020-10-05 PROCEDURE — 97110 THERAPEUTIC EXERCISES: CPT | Performed by: OCCUPATIONAL THERAPIST

## 2020-10-05 NOTE — PROGRESS NOTES
OCCUPATIONAL THERAPY PROGRESS NOTE    Date:  10/5/2020                  Initial Evaluation Date: 2020    Patient Name:  Conrad Stephens    :  1960    Restrictions/Precautions:  Follow Aia 16 arthroplasty Stablyx protocol  Diagnosis:  L Aia 16 OA, [address] arthroplasty Stablyx       M18.12 (ICD-10-CM) - Arthritis of carpometacarpal (Aia 16) joint of left thumb                            Insurance/Certification information:  Medical [place]   Referring Physician:  Dr. Arun Molina  Date of Surgery/Injury: sx   2020 (3 weeks post op on 10/07/2020)  Plan of care signed (Y/N):  Y  Visit# / total visits: 3  / 18    Pain Level:   /10 at rest and in the brace, with activity 5/10    Subjective: Pt reports extreme tenderness into the forearm and continued bruising. Objective:  Updated POC to be completed by visit 10. INTERVENTION: COMPLETED: SPECIFICS/COMMENTS:   Modality:     MHP x For soft tissue warm-up 15 mins        AROM:     Wrist  x Flexion, extension, RD, UD over forearm wedge. x15 each exercise   Digital exercises x Flexion, hook fist, finger abb/abdcution   Thumb IP and MP blocked  x Flex/ext  MCP, PIP  2 sets x15 with fair tolerance. Wrisitizer x 5 mins with LUE and elbow on table for wrist isolation. AAROM:               PROM/Stretching:     IP thumb flexion x Gently at start of session         Scar Mass/Edema Control:     Scar masage x Steri strips oss- gentle massasge and lifting of scar. Can do at home   Size D compression sleeve x Fitted with to wear as needed to decrease pain and edema. Strengthening:               Other:     Splint  x Adjusted around the web space to decrease pressure. HEP x Cont  With thumb IP - added MP blocked flex/ext, finger fist, hook and finger add/abduction and wrist wiggle   Kinesio Tape  x 2 basket weave strips down forearm for lymphatic correction.  Pt educated on removal technique if irritation should occur     Assessment/Comments: Pt is making Fair + progress toward stated plan of care. Pt tolerated session well with no increased pain reported. Added wrist flexion/extension , RD and UD. Pt did tolerate MP & IP blocking with less pain than previous session. Pt continues to report sensitivity in her forearm and significant bruising. Kinesio tape application completed to assist with sensitivity and lymphatic drainage.     -Rehab Potential: Good  -Requires OT Follow Up: Yes  Time In: 3:00 pm             Time Out: 4:00 pm              Visit #: 3    Treatment Charges: Mins Units   Modalities     Ther Exercise 40 3   Manual Therapy 20 1   Thera Activities     ADL/Home Mgt      Neuro Re-education     Gait Training     Group Therapy     Non-Billable Service Time-MHP     Other     Total Time/Units 60 4       -Response to Treatment: Pt pleased that her L arm is looking better and less bruised and the pian has remained low - occasionally moderate. Goals: Goals for pt can be see on initial eval occurring on 9/28/2020    Plan:   [x]  Continues Plan of care: Treatment covered based on POC and graduated to patient's progress. Pt education continues at each visit to obtain maximum benefits from skilled OT intervention.   []  Alter Plan of care:   []  Discharge:      Tee Curtis Jose Luis 85, OTR/L #[id number]

## 2020-10-08 ENCOUNTER — TREATMENT (OUTPATIENT)
Dept: OCCUPATIONAL THERAPY | Age: 60
End: 2020-10-08
Payer: COMMERCIAL

## 2020-10-08 PROCEDURE — 97110 THERAPEUTIC EXERCISES: CPT | Performed by: OCCUPATIONAL THERAPIST

## 2020-10-08 PROCEDURE — 97140 MANUAL THERAPY 1/> REGIONS: CPT | Performed by: OCCUPATIONAL THERAPIST

## 2020-10-08 NOTE — PROGRESS NOTES
OCCUPATIONAL THERAPY PROGRESS NOTE    Date:  10/8/2020                  Initial Evaluation Date: 2020    Patient Name:  Augusta Ferrari    :  1960    Restrictions/Precautions:  Follow [facility] arthroplasty Stablyx protocol  Diagnosis:  L [facility] OA, [address] arthroplasty Stablyx       M18.12 (ICD-10-CM) - Arthritis of carpometacarpal ([facility]) joint of left thumb                            Insurance/Certification information:  Medical [place]   Referring Physician:  Dr. Frank Apple  Date of Surgery/Injury: sx   2020 (3 weeks post op on 10/07/2020)  Plan of care signed (Y/N):  Y  Visit# / total visits:     Pain Level:  1-2 /10 at rest and in the brace, with activity /10    Subjective: Pt reports she is having pain with MCP joint blocking. Objective:  Updated POC to be completed by visit 10. INTERVENTION: COMPLETED: SPECIFICS/COMMENTS:   Modality:     MHP x For soft tissue warm-up 15 mins        AROM:     Wrist  x Flexion, extension, RD, UD over forearm wedge. x15 each exercise   Boading Balls x 3 mins clock-wise and counter clock-wise with moderate difficulty noted   [place] activity x Radial abduction/adduction to move marbles across the towel with thumb   Digital exercises  Flexion, hook fist, finger abb/abdcution   Radial and Palmar abduction x x15 each exercise. Added to HEP   Thumb IP and MP blocked  x Flex/ext  MCP, PIP  2 sets x15 with fair tolerance. Wrisitizer x 5 mins with LUE and elbow on table for wrist isolation. AAROM:               PROM/Stretching:     IP thumb flexion x Gently at start of session         Scar Mass/Edema Control:     Scar masage x Steri strips oss- gentle massasge and lifting of scar. Can do at home   Size D compression sleeve x Fitted with to wear as needed to decrease pain and edema. Strengthening:               Other:     Splint  x Adjusted around the web space to decrease pressure.    HEP x Cont  With thumb IP - added MP blocked flex/ext, finger fist, hook and finger add/abduction and wrist wiggle   Kinesio Tape  x 2 basket weave strips down forearm for lymphatic correction. Pt educated on removal technique if irritation should occur     Assessment/Comments: Pt is making Fair + progress toward stated plan of care. Initiated Radial and palmar abduction with good tolerance. Pt reports less pain in wrist after exercises this date. Splint modified to improve comfort and pt continues to report improvement. L Hand ROM  Edema AROM [x]? PROM[]? IE  10/08/2020       THUMB             7.8cm MP Flexion/Extension 0-50*/ 14-23* H 57*/0*  -       6.5 cm IP Flexion/Extension 0-80*/ 23-30* H 14*/0*  Arthritic bump on tip of IP  -         Radial Abduction 53-71* TBA 30*          Palmar Abduction 50-71* TBA  30*            -Rehab Potential: Good  -Requires OT Follow Up: Yes  Time In: 9:00 am             Time Out: 10:00 am              Visit #: 4    Treatment Charges: Mins Units   Modalities     Ther Exercise 40 3   Manual Therapy 20 1   Thera Activities     ADL/Home Mgt      Neuro Re-education     Gait Training     Group Therapy     Non-Billable Service Time-MHP     Other     Total Time/Units 60 4       -Response to Treatment: Pt pleased that her L arm is looking better and less bruised and the pian has remained low - occasionally moderate. Goals: Goals for pt can be see on initial eval occurring on 9/28/2020    Plan:   [x]  Continues Plan of care: Treatment covered based on POC and graduated to patient's progress. Pt education continues at each visit to obtain maximum benefits from skilled OT intervention.   []  Alter Plan of care:   []  Discharge:      Adonay Curtis Jose Luis 85, OTR/L #[id number]

## 2020-10-12 ENCOUNTER — TREATMENT (OUTPATIENT)
Dept: OCCUPATIONAL THERAPY | Age: 60
End: 2020-10-12
Payer: COMMERCIAL

## 2020-10-12 PROCEDURE — 97140 MANUAL THERAPY 1/> REGIONS: CPT | Performed by: OCCUPATIONAL THERAPIST

## 2020-10-12 PROCEDURE — 97110 THERAPEUTIC EXERCISES: CPT | Performed by: OCCUPATIONAL THERAPIST

## 2020-10-12 NOTE — PROGRESS NOTES
OCCUPATIONAL THERAPY PROGRESS NOTE    Date:  10/12/2020                  Initial Evaluation Date: 2020    Patient Name:  Shira Jones    :  1960    Restrictions/Precautions:  Follow [facility] arthroplasty Stablyx protocol  Diagnosis:  L [facility] OA, [address] arthroplasty Stablyx       M18.12 (ICD-10-CM) - Arthritis of carpometacarpal ([facility]) joint of left thumb                            Insurance/Certification information:  Medical [place]   Referring Physician:  Dr. Corina Serna  Date of Surgery/Injury: sx   2020 (4 weeks post op on 10/14/2020)  Plan of care signed (Y/N):  Y  Visit# / total visits:     Pain Level:  2 /10 at rest and in the brace, with activity 10    Subjective: Pt reports she is feeling a little better and is not as sore with MCP blocking after PROM     Objective:  Updated POC to be completed by visit 10. INTERVENTION: COMPLETED: SPECIFICS/COMMENTS:   Modality:     MHP x For soft tissue warm-up 15 mins        AROM:     Wrist  x Flexion, extension, RD, UD over forearm wedge. x15 each exercise   Boading Balls  3 mins clock-wise and counter clock-wise with moderate difficulty noted   [place] activity  Radial abduction/adduction to move marbles across the towel with thumb   Digital exercises  Flexion, hook fist, finger abb/abdcution   Radial and Palmar abduction x x15 each exercise. Added to HEP   Thumb IP and MP blocked  x Flex/ext  MCP, PIP  2 sets x15 with fair tolerance. Wrisitizer x 5 mins with LUE and elbow on table for wrist isolation. AAROM:               PROM/Stretching:     MP & IP thumb flexion x Gently at start of session         Scar Mass/Edema Control:     Scar masage x Steri strips oss- gentle massasge and lifting of scar. Can do at home   Size D compression sleeve x Fitted with to wear as needed to decrease pain and edema.          Strengthening/Fine motor:     Peg board activity x Completed large pegs than small pegs with LUE with moderate difficulty and cues for intervention.   []  Alter Plan of care:   []  Discharge:      Jatinder Curtis Jose Luis 85, OTR/L #[id number]

## 2020-10-15 ENCOUNTER — TREATMENT (OUTPATIENT)
Dept: OCCUPATIONAL THERAPY | Age: 60
End: 2020-10-15
Payer: COMMERCIAL

## 2020-10-15 PROCEDURE — 97140 MANUAL THERAPY 1/> REGIONS: CPT | Performed by: OCCUPATIONAL THERAPIST

## 2020-10-15 PROCEDURE — 97110 THERAPEUTIC EXERCISES: CPT | Performed by: OCCUPATIONAL THERAPIST

## 2020-10-15 NOTE — PROGRESS NOTES
edema.         Strengthening/Fine motor:     Peg board activity  Completed large pegs than small pegs with LUE with moderate difficulty and cues for preventing rigidity. Paper Clip chain x Completed with BUE's with no difficulty   Stringing beads  Using LUE to complete stringing beads with no difficulty    Other:     Splint  x Adjusted around the web space to decrease pressure. HEP x Cont  With thumb IP - added MP blocked flex/ext, finger fist, hook and finger add/abduction and wrist wiggle   Kinesio Tape  x 2 basket weave strips down forearm for lymphatic correction. Pt educated on removal technique if irritation should occur     Assessment/Comments: Pt is making Fair + progress toward stated plan of care. Pt tolerated session well and will begin weaning off splint for light activities. Pt did tolerate increased motion this date and reported that she did feel better with functional movement compared to previous session.     -Rehab Potential: Good  -Requires OT Follow Up: Yes  Time In: 9:00 am             Time Out: 10:00 am              Visit #: 6    Treatment Charges: Mins Units   Modalities     Ther Exercise 40 3   Manual Therapy 20 1   Thera Activities     ADL/Home Mgt      Neuro Re-education     Gait Training     Group Therapy     Non-Billable Service Time-MHP     Other     Total Time/Units 60 4       -Response to Treatment: Pt pleased that her L arm is looking better and less bruised and the pian has remained low - occasionally moderate. Goals: Goals for pt can be see on initial eval occurring on 9/28/2020    Plan:   [x]  Continues Plan of care: Treatment covered based on POC and graduated to patient's progress. Pt education continues at each visit to obtain maximum benefits from skilled OT intervention.   []  Alter Plan of care:   []  Discharge:      Yovany Curtis Jose Luis 85, OTR/L #[id number]

## 2020-10-19 ENCOUNTER — TREATMENT (OUTPATIENT)
Dept: OCCUPATIONAL THERAPY | Age: 60
End: 2020-10-19
Payer: COMMERCIAL

## 2020-10-19 PROCEDURE — 97140 MANUAL THERAPY 1/> REGIONS: CPT | Performed by: OCCUPATIONAL THERAPIST

## 2020-10-19 PROCEDURE — 97110 THERAPEUTIC EXERCISES: CPT | Performed by: OCCUPATIONAL THERAPIST

## 2020-10-19 NOTE — PROGRESS NOTES
OCCUPATIONAL THERAPY PROGRESS NOTE    Date:  10/19/2020                  Initial Evaluation Date: 2020    Patient Name:  Latha June    :  1960    Restrictions/Precautions:  Follow [facility] arthroplasty Stablyx protocol  Diagnosis:  L [facility] OA, [address] arthroplasty Stablyx       M18.12 (ICD-10-CM) - Arthritis of carpometacarpal ([facility]) joint of left thumb                            Insurance/Certification information:  Medical [place]   Referring Physician:  Dr. Jose Alfredo Alfaro  Date of Surgery/Injury: sx   2020 (5 weeks post op on 10/21/2020)  Plan of care signed (Y/N):  Y  Visit# / total visits:     Pain Level:  10 at rest and in the brace, with activity 4/10    Subjective: Pt reports she is still very swollen. Objective:  Updated POC to be completed by visit 10. INTERVENTION: COMPLETED: SPECIFICS/COMMENTS:   Modality:     MHP x For soft tissue warm-up 15 mins        AROM:     Wrist   Flexion, extension, RD, UD over forearm wedge. x15 each exercise   True Balance x  5 mins using RUE   Towel scrunches x 5 mins and 5 mins thumb scrunch   Boading Balls x 5 mins clock-wise and counter clock-wise with moderate difficulty noted   [place] activity  Radial abduction/adduction to move marbles across the towel with thumb   Opposition activity x Pinching marbles with 5th digit and moving down the pinky than placing into container. To complete within 5/10 pain. Digital exercises  Flexion, hook fist, finger abb/abdcution   Radial and Palmar abduction x x15 each exercise. Added to HEP   Thumb IP and MP blocked   Flex/ext  MCP, PIP  2 sets x15 with fair tolerance. Wrisitizer  5 mins with LUE and elbow on table for wrist isolation. AAROM:               PROM/Stretching:     MP & IP thumb flexion x Gently at start of session         Scar Mass/Edema Control:     Scar masage x Steri strips oss- gentle massasge and lifting of scar.  Can do at home   Size D compression sleeve x Fitted with to wear as needed to decrease pain and edema. Strengthening/Fine motor:     Cards x Flipping, dealing and shuffling with fair tolerance. Peg board activity  Completed large pegs than small pegs with LUE with moderate difficulty and cues for preventing rigidity. Paper Clip chain  Completed with BUE's with no difficulty   Stringing beads x Using LUE to complete stringing beads with no difficulty    Other:     Splint  x Adjusted around the web space to decrease pressure. HEP x Cont  With thumb IP - added MP blocked flex/ext, finger fist, hook and finger add/abduction and wrist wiggle   Kinesio Tape  x 2 basket weave strips down forearm for lymphatic correction. Pt educated on removal technique if irritation should occur     Assessment/Comments: Pt is making Fair + progress toward stated plan of care. Pt continues to tolerate session well with increased ability to complete tasks. Pt focusing on opposition of thumb this session with fair tolerance and educated on opposition activity to complete at home. Splint modified due to slight reddness noted at the end of the incision site.     -Rehab Potential: Good  -Requires OT Follow Up: Yes  Time In: 1:00 pm             Time Out: 2:00 pm             Visit #: 7    Treatment Charges: Mins Units   Modalities     Ther Exercise 40 3   Manual Therapy 20 1   Thera Activities     ADL/Home Mgt      Neuro Re-education     Gait Training     Group Therapy     Non-Billable Service Time-MHP     Other     Total Time/Units 60 4       -Response to Treatment: Pt pleased that her L arm is looking better and less bruised and the pian has remained low - occasionally moderate. Goals: Goals for pt can be see on initial eval occurring on 9/28/2020    Plan:   [x]  Continues Plan of care: Treatment covered based on POC and graduated to patient's progress. Pt education continues at each visit to obtain maximum benefits from skilled OT intervention.   []  Alter Plan of care:   []  Discharge:      Connie Bumpers Ralph 62, OTR/L #[id number]

## 2020-10-22 ENCOUNTER — TREATMENT (OUTPATIENT)
Dept: OCCUPATIONAL THERAPY | Age: 60
End: 2020-10-22
Payer: COMMERCIAL

## 2020-10-22 ENCOUNTER — OFFICE VISIT (OUTPATIENT)
Dept: ORTHOPEDIC SURGERY | Age: 60
End: 2020-10-22

## 2020-10-22 VITALS — BODY MASS INDEX: 21.86 KG/M2 | RESPIRATION RATE: 18 BRPM | HEIGHT: 66 IN | TEMPERATURE: 97.3 F | WEIGHT: 136 LBS

## 2020-10-22 PROCEDURE — 99024 POSTOP FOLLOW-UP VISIT: CPT | Performed by: ORTHOPAEDIC SURGERY

## 2020-10-22 PROCEDURE — 97110 THERAPEUTIC EXERCISES: CPT | Performed by: OCCUPATIONAL THERAPIST

## 2020-10-22 PROCEDURE — 97140 MANUAL THERAPY 1/> REGIONS: CPT | Performed by: OCCUPATIONAL THERAPIST

## 2020-10-22 NOTE — PROGRESS NOTES
Weeks postop left thumb Stablyx implant arthroplasty of thumb. Overall she is doing well. Base complaint is stiffness. She is progressing nicely in therapy. Physical exam: Scar mature. Opposition limited to the small finger. Negative C[facility] grind test.  Neurovascular intact. X-rays in office today: AP lateral obliques of the left thumb demonstrate stable implant arthroplasty of the thumb basal joint. Negative for fracture dislocations. Impression office x-rays: Stable Stablyx implant arthroplasty thumb    Assessment: 5 weeks postop    Plan    Advance in therapy as tolerated. Discontinue bracing. Transition to a Comfort Cool brace as needed. Improve range of motion. Progress with modalities and strengthening as tolerated.   Follow-up in 5 weeks

## 2020-10-22 NOTE — PROGRESS NOTES
OCCUPATIONAL THERAPY PROGRESS NOTE    Date:  10/22/2020                  Initial Evaluation Date: 2020    Patient Name:  Marine Triplett    :  1960    Restrictions/Precautions:  Follow [facility] arthroplasty Stablyx protocol  Diagnosis:  L [facility] OA, [address] arthroplasty Stablyx       M18.12 (ICD-10-CM) - Arthritis of carpometacarpal ([facility]) joint of left thumb                            Insurance/Certification information:  [employer] [place]   Referring Physician:  Dr. Head Postal  Date of Surgery/Injury: sx   2020 (5 weeks post op on 10/21/2020)  Plan of care signed (Y/N):  Y  Visit# / total visits:     Pain Level:  0 /10 at rest and in the brace, throbbing by the end of the day however with exercises no pain. Subjective: Pt reports she feels throbbing by the end of the day      Objective:  Updated POC to be completed by visit 10. INTERVENTION: COMPLETED: SPECIFICS/COMMENTS:   Modality:     MHP x For soft tissue warm-up 15 mins        AROM:     Wrist   Flexion, extension, RD, UD over forearm wedge. x15 each exercise   Lee transfer x Using RUE to complete full grasp and transfer beans from one container to another. True Balance   5 mins using RUE   Towel scrunches  5 mins and 5 mins thumb scrunch   Boading Balls  5 mins clock-wise and counter clock-wise with moderate difficulty noted   [place] activity  Radial abduction/adduction to move marbles across the towel with thumb   Opposition activity x Pinching beans with each digit and thumb alternating with mod difficulty with 4th and 5th digit. Digital exercises  Flexion, hook fist, finger abb/abdcution   Radial and Palmar abduction x x15 each exercise. Added to HEP   Thumb IP and MP blocked   Flex/ext  MCP, PIP  2 sets x15 with fair tolerance. Wrisitizer  5 mins with LUE and elbow on table for wrist isolation.     AAROM:               PROM/Stretching:     MP & IP thumb flexion x Gently at start of session         Scar Mass/Edema Control: better and less bruised and the pian has remained low - occasionally moderate. Goals: Goals for pt can be see on initial eval occurring on 9/28/2020    Plan:   [x]  Continues Plan of care: Treatment covered based on POC and graduated to patient's progress. Pt education continues at each visit to obtain maximum benefits from skilled OT intervention.   []  Alter Plan of care:   []  Discharge:      Eron Curtis Jose Luis 85, OTR/L #[id number]

## 2020-10-26 ENCOUNTER — TREATMENT (OUTPATIENT)
Dept: OCCUPATIONAL THERAPY | Age: 60
End: 2020-10-26
Payer: COMMERCIAL

## 2020-10-26 PROCEDURE — 97110 THERAPEUTIC EXERCISES: CPT | Performed by: OCCUPATIONAL THERAPIST

## 2020-10-26 PROCEDURE — 97140 MANUAL THERAPY 1/> REGIONS: CPT | Performed by: OCCUPATIONAL THERAPIST

## 2020-10-26 NOTE — PROGRESS NOTES
OCCUPATIONAL THERAPY PROGRESS NOTE    Date:  10/26/2020                  Initial Evaluation Date: 2020    Patient Name:  Oli Castro    :  1960    Restrictions/Precautions:  Follow [address] arthroplasty Stablyx protocol  Diagnosis:  L [address] OA, [address] arthroplasty Stablyx       M18.12 (ICD-10-CM) - Arthritis of carpometacarpal ([address]) joint of left thumb                            Insurance/Certification information:  [employer] [place]   Referring Physician:  Dr. Gustavo Lee  Date of Surgery/Injury: sx   2020 (6 weeks post op on 10/28/2020)  Plan of care signed (Y/N):  Y  Visit# / total visits:     Pain Level: soreness at the bottom of the [address] area. Subjective: Pt reports the throbbing is going away. Objective:  Updated POC to be completed by visit 10. INTERVENTION: COMPLETED: SPECIFICS/COMMENTS:   Modality:     MHP x For soft tissue warm-up 15 mins        AROM:     Wrist   Flexion, extension, RD, UD over forearm wedge. x15 each exercise   Bean transfer  Using RUE to complete full grasp and transfer beans from one container to another. True Balance   5 mins using LUE   Towel scrunches  5 mins and 5 mins thumb scrunch   Boading Balls x 5 mins clock-wise and counter clock-wise with moderate difficulty noted   [place] activity  Radial abduction/adduction to move marbles across the towel with thumb   Opposition activity  Pinching beans with each digit and thumb alternating with mod difficulty with 4th and 5th digit. Digital exercises  Flexion, hook fist, finger abb/abdcution   Radial and Palmar abduction x x10 each exercise. Added to HEP   Thumb IP and MP blocked   Flex/ext  MCP, PIP  2 sets x15 with fair tolerance. Wrisitizer  5 mins with LUE and elbow on table for wrist isolation. AAROM:               PROM/Stretching:     MP & IP thumb flexion x Gently at start of session         Scar Mass/Edema Control:     Scar masage x Steri strips oss- gentle massasge and lifting of scar. Can do at home   Size D compression sleeve  Fitted with to wear as needed to decrease pain and edema. Strengthening/Fine motor:     Cards  Flipping, dealing and shuffling with fair tolerance. Tweezer activity x Using tweezers to  pegs and place into peg board with LUE   Putty and beads activity x Using LUE to manipulate putty and pinch out beads from putty with thumb and place into container. [place] activity  Using L thumb to pull pennies back towards her to work on improvement of movement and functional use. Peg board activity  Completed large pegs than small pegs with LUE with moderate difficulty and cues for preventing rigidity. Paper Clip chain  Completed with BUE's with no difficulty   Stringing beads  Using LUE to complete stringing beads with no difficulty    Other:     Splint  x Issued L medium comfort cool for during the day and night due to throbbing reported. HEP x Cont  With thumb IP - added MP blocked flex/ext, finger fist, hook and finger add/abduction and wrist wiggle   Kinesio Tape  x 2 basket weave strips down forearm for lymphatic correction. Pt educated on removal technique if irritation should occur     Assessment/Comments: Pt is making Fair + progress toward stated plan of care. Pt tolerated light initiation of putty activities this date. Will monitor how sore pt is after session and initiate strengthening and measurements next session if pt reports minimal soreness. Completed more massage and stretching at the site of pain with improved tolerance.      -Rehab Potential: Good  -Requires OT Follow Up: Yes  Time In: 09:00 am             Time Out: 10:00 am             Visit #: 9    Treatment Charges: Mins Units   Modalities     Ther Exercise 40 3   Manual Therapy 20 1   Thera Activities     ADL/Home Mgt      Neuro Re-education     Gait Training     Group Therapy     Non-Billable Service Time-MHP     Other     Total Time/Units 60 4       -Response to Treatment: Pt pleased that her L arm is looking better and less bruised and the pian has remained low - occasionally moderate. Goals: Goals for pt can be see on initial eval occurring on 9/28/2020    Plan:   [x]  Continues Plan of care: Treatment covered based on POC and graduated to patient's progress. Pt education continues at each visit to obtain maximum benefits from skilled OT intervention.   []  Alter Plan of care:   []  Discharge:      Dipika Curtis Jose Luis 85, OTR/L #[id number]

## 2020-10-29 ENCOUNTER — TREATMENT (OUTPATIENT)
Dept: OCCUPATIONAL THERAPY | Age: 60
End: 2020-10-29
Payer: COMMERCIAL

## 2020-10-29 PROCEDURE — 97110 THERAPEUTIC EXERCISES: CPT | Performed by: OCCUPATIONAL THERAPIST

## 2020-10-29 PROCEDURE — 97140 MANUAL THERAPY 1/> REGIONS: CPT | Performed by: OCCUPATIONAL THERAPIST

## 2020-10-29 PROCEDURE — 97022 WHIRLPOOL THERAPY: CPT | Performed by: OCCUPATIONAL THERAPIST

## 2020-10-29 NOTE — PROGRESS NOTES
OCCUPATIONAL THERAPY PROGRESS NOTE/RE-ASSESSMENT    Date:  10/29/2020                  Initial Evaluation Date: 2020    Patient Name:  Magy Hankins    :  1960    Restrictions/Precautions:  Follow [address] arthroplasty Stablyx protocol  Diagnosis:  L [address] OA, [address]  arthroplasty Stablyx       M18.12 (ICD-10-CM) - Arthritis of carpometacarpal ([address]) joint of left thumb                            Insurance/Certification information:  Medical [place]   Referring Physician:  Dr. Carolee Cooper  Date of Surgery/Injury: sx   2020 (6 weeks post op on 10/28/2020)  Plan of care signed (Y/N):  Y  Visit# / total visits: 10  / 18    Pain Level: soreness at the bottom of the [address] area. Subjective: Pt reports pain at MCP joint and increased soreness in the thumb the last two days. Objective:  Updated POC to be completed by visit 10. INTERVENTION: COMPLETED: SPECIFICS/COMMENTS:   Modality: Ice massage x At the end of the session around the thenar eminence and MCP     MHP  For soft tissue warm-up 15 mins   Fludiotherapy x 15 mins at the start of the session with AROM encouraged. AROM:     Wrist   Flexion, extension, RD, UD over forearm wedge. x15 each exercise   Bean transfer  Using RUE to complete full grasp and transfer beans from one container to another. True Balance   5 mins using LUE   Towel scrunches  5 mins and 5 mins thumb scrunch   Boading Balls  5 mins clock-wise and counter clock-wise with moderate difficulty noted   [place] activity  Radial abduction/adduction to move marbles across the towel with thumb   Opposition activity  Pinching beans with each digit and thumb alternating with mod difficulty with 4th and 5th digit. Digital exercises  Flexion, hook fist, finger abb/abdcution   Radial and Palmar abduction x x10 each exercise. Added to HEP   Thumb IP and MP blocked   Flex/ext  MCP, PIP  2 sets x15 with fair tolerance.     Wrisitizer  5 mins with LUE and elbow on table for wrist isolation. AAROM:     MCP place & hold x x12 completed with fair tolerance due to limited MCP ROM. PROM/Stretching:     MP & IP thumb flexion x Gently at start of session         Scar Mass/Edema Control:     Scar masage x Steri strips oss- gentle massasge and lifting of scar. Can do at home   Size D compression sleeve  Fitted with to wear as needed to decrease pain and edema. Retrograde massage  x Completed to thenar eminence. Strengthening/Fine motor:     Cards  Flipping, dealing and shuffling with fair tolerance. Tweezer activity  Using tweezers to  pegs and place into peg board with LUE   Putty and beads activity  Using LUE to manipulate putty and pinch out beads from putty with thumb and place into container. [place] activity  Using L thumb to pull pennies back towards her to work on improvement of movement and functional use. Peg board activity  Completed large pegs than small pegs with LUE with moderate difficulty and cues for preventing rigidity. Paper Clip chain  Completed with BUE's with no difficulty   Stringing beads  Using LUE to complete stringing beads with no difficulty    Other:     Splint  x Issued L medium comfort cool for during the day and night due to throbbing reported. HEP x Cont  With thumb IP - added MP blocked flex/ext, finger fist, hook and finger add/abduction and wrist wiggle   Kinesio Tape  x 2 basket weave strips down forearm for lymphatic correction. Pt educated on removal technique if irritation should occur     Assessment/Comments: Pt is making Fair + progress toward stated plan of care. Pt demonstrated more MP stiffness and reported pain on the volar portion of the thumb and does demonstrate small lump at that point. Lighter session completed this date with good tolerance and strengthening was delayed due to increased pain since last session.  Pt was re-assessed and MP place and holds were completed with improvement as well as increased ROM noted.     L Hand ROM  Edema AROM [x]? PROM[]? IE  10/29/2020       THUMB             7.8cm MP Flexion/Extension 0-50*/ 14-23* H 57*/0*  35*/0*       6.5 cm IP Flexion/Extension 0-80*/ 23-30* H 14*/0*  Arthritic bump on tip of IP  64*/0*         Radial Abduction 53-71* TBA 43*         Palmar Abduction 50-71* TBA  50*          Comment: Hand Dominance is R     Edema Description/Circumferential Measurements:              L starting at ulnar head : 16.5 cm, 16 cm               R starting at ulnar head : 15.6 cm  Dynamometer (setting 2):                              Left: 18 #       6/10 pain reported                                            Right: 70#                    Pinch Meter:              Lateral: Left= 8#  7/10 pain,     Right= 16#              Palmar 3 point: Left= 3/10  6/10 pain,    Right= 16#  9 Hole Peg Test:              Left: 22 seconds              Right: 18 seconds     QuickDASH Score: 81.8% disability reported        -Rehab Potential: Good  -Requires OT Follow Up: Yes  Time In: 09:00 am             Time Out: 10:00 am             Visit #: 10    Treatment Charges: Mins Units   Modalities- Fluido 15 1   Ther Exercise 25 2   Manual Therapy 20 1   Thera Activities     ADL/Home Mgt      Neuro Re-education     Gait Training     Group Therapy     Non-Billable Service Time-[facility]     Other     Total Time/Units 60 4       -Response to Treatment: Pt pleased that her L arm is looking better and less bruised and the pian has remained low - occasionally moderate. Goals: Goals for pt can be see on initial eval occurring on 9/28/2020    Plan:   [x]  Continues Plan of care: Treatment covered based on POC and graduated to patient's progress. Pt education continues at each visit to obtain maximum benefits from skilled OT intervention.   []  Alter Plan of care:   []  Discharge:      Abel Bernheim Luite Jose Luis 85, OTR/L #[id number]

## 2020-11-02 ENCOUNTER — TREATMENT (OUTPATIENT)
Dept: OCCUPATIONAL THERAPY | Age: 60
End: 2020-11-02
Payer: COMMERCIAL

## 2020-11-02 PROCEDURE — 97110 THERAPEUTIC EXERCISES: CPT | Performed by: OCCUPATIONAL THERAPIST

## 2020-11-02 PROCEDURE — 97140 MANUAL THERAPY 1/> REGIONS: CPT | Performed by: OCCUPATIONAL THERAPIST

## 2020-11-02 PROCEDURE — 97022 WHIRLPOOL THERAPY: CPT | Performed by: OCCUPATIONAL THERAPIST

## 2020-11-02 NOTE — PROGRESS NOTES
OCCUPATIONAL THERAPY PROGRESS NOTE    Date:  2020                  Initial Evaluation Date: 2020    Patient Name:  Lucy Butler    :  1960    Restrictions/Precautions:  Follow [facility] arthroplasty [facility] protocol  Diagnosis:  L [facility] OA, [address] arthroplasty [facility]       M18.12 (ICD-10-CM) - Arthritis of carpometacarpal ([facility]) joint of left thumb                            Insurance/Certification information:  Medical [place]   Referring Physician:  Dr. Anish Simon  Date of Surgery/Injury: sx   2020 (6 weeks post op on 10/28/2020)  Plan of care signed (Y/N):  Y  Visit# / total visits:     Pain Level: 1/10 soreness at the bottom of the [facility] area. Subjective: Pt reports less pain than this weekend and was able to do all of her exercises this morning. Objective:  Updated POC to be completed by visit 10. INTERVENTION: COMPLETED: SPECIFICS/COMMENTS:   Modality: Ice massage  At the end of the session around the thenar eminence and MCP     MHP  For soft tissue warm-up 15 mins   Fludiotherapy x 15 mins at the start of the session with AROM encouraged. AROM:     Wrist   Flexion, extension, RD, UD over forearm wedge. x15 each exercise   Bean transfer  Using RUE to complete full grasp and transfer beans from one container to another. True Balance   5 mins using LUE   Towel scrunches x 5 mins and 5 mins thumb scrunch   Boading Balls x 5 mins clock-wise and counter clock-wise with moderate difficulty noted   [place] activity  Radial abduction/adduction to move marbles across the towel with thumb   Opposition activity  Pinching beans with each digit and thumb alternating with mod difficulty with 4th and 5th digit. Digital exercises  Flexion, hook fist, finger abb/abdcution   Radial and Palmar abduction x x10 each exercise. Added to HEP   Thumb IP and MP blocked   Flex/ext  MCP, PIP  2 sets x15 with fair tolerance.     Wrisitizer  5 mins with LUE and elbow on table for wrist isolation. AAROM:     MCP place & hold x x12 completed with fair tolerance due to limited MCP ROM. PROM/Stretching:     MP & IP thumb flexion x Gently at start of session         Scar Mass/Edema Control:     Scar masage x Steri strips oss- gentle massasge and lifting of scar. Can do at home   Size D compression sleeve  Fitted with to wear as needed to decrease pain and edema. Retrograde massage  x Completed to thenar eminence. Strengthening/Fine motor:     Cards  Flipping, dealing and shuffling with fair tolerance. Tweezer activity  Using tweezers to  pegs and place into peg board with LUE   Putty and beads activity  Using LUE to manipulate putty- tan color. and pinch out beads from putty with thumb and place into container. [place] activity x Using L thumb to pull pennies back towards her to work on improvement of movement and functional use. Peg board activity  Completed large pegs than small pegs with LUE with moderate difficulty and cues for preventing rigidity. Paper Clip chain  Completed with BUE's with no difficulty   Stringing beads x Using LUE to complete stringing beads with no difficulty    Other:     Red foam block x Issued for light manipulation and squeezing for HEP. Splint  x Issued L medium comfort cool for during the day and night due to throbbing reported. HEP x Cont  With thumb IP - added MP blocked flex/ext, finger fist, hook and finger add/abduction and wrist wiggle   Kinesio Tape  x 2 basket weave strips down forearm for lymphatic correction. Pt educated on removal technique if irritation should occur     Assessment/Comments: Pt is making Fair + progress toward stated plan of care. Issued red foam block to attempt light strengthening within pt's tolerance. Pt tolerated all activities better this session with improvement with pain reported. Pt continues to demonstrate MCP stiffness with pain and pt instructed on light activity and resting MCP when needed. Pt did tolerate light putty manipulation this date and was encouraged to continue to complete activities within her tolerance and not over doing.     -Rehab Potential: Good  -Requires OT Follow Up: Yes  Time In: 11:00 am             Time Out: 12:00 pm             Visit #: 11    Treatment Charges: Mins Units   Modalities- Fluido 15 1   Ther Exercise 25 2   Manual Therapy 20 1   Thera Activities     ADL/Home Mgt      Neuro Re-education     Gait Training     Group Therapy     Non-Billable Service Time-MHP     Other     Total Time/Units 60 4       -Response to Treatment: Pt pleased that her L arm is looking better and less bruised and the pian has remained low - occasionally moderate. Goals: Goals for pt can be see on initial eval occurring on 9/28/2020    Plan:   [x]  Continues Plan of care: Treatment covered based on POC and graduated to patient's progress. Pt education continues at each visit to obtain maximum benefits from skilled OT intervention.   []  Alter Plan of care:   []  Discharge:      Hoda Curtis Jose Luis 85, OTR/L #[id number]

## 2020-11-05 ENCOUNTER — TREATMENT (OUTPATIENT)
Dept: OCCUPATIONAL THERAPY | Age: 60
End: 2020-11-05
Payer: COMMERCIAL

## 2020-11-05 PROCEDURE — 97110 THERAPEUTIC EXERCISES: CPT | Performed by: OCCUPATIONAL THERAPIST

## 2020-11-05 PROCEDURE — 97140 MANUAL THERAPY 1/> REGIONS: CPT | Performed by: OCCUPATIONAL THERAPIST

## 2020-11-05 PROCEDURE — 97022 WHIRLPOOL THERAPY: CPT | Performed by: OCCUPATIONAL THERAPIST

## 2020-11-05 NOTE — PROGRESS NOTES
OCCUPATIONAL THERAPY PROGRESS NOTE    Date:  2020                  Initial Evaluation Date: 2020    Patient Name:  Shawanda Santos    :  1960    Restrictions/Precautions:  Follow [facility] arthroplasty [facility] protocol  Diagnosis:  L [facility] OA, [address] arthroplasty [facility]       M18.12 (ICD-10-CM) - Arthritis of carpometacarpal ([facility]) joint of left thumb                            Insurance/Certification information:  [employer] [place]   Referring Physician:  Dr. Wellington Valentin  Date of Surgery/Injury: sx   2020 (7 weeks post op on 2020)  Plan of care signed (Y/N):  Y  Visit# / total visits:     Pain Level: 0/10 however slight aching  with motion. Subjective: Pt reports no increased pain and is using her thumb more. Objective:  Updated POC to be completed by visit 10. INTERVENTION: COMPLETED: SPECIFICS/COMMENTS:   Modality: Ice massage  At the end of the session around the thenar eminence and MCP     MHP  For soft tissue warm-up 15 mins   Fludiotherapy x 15 mins at the start of the session with AROM encouraged. AROM:     Wrist   Flexion, extension, RD, UD over forearm wedge. x15 each exercise   Bean transfer  Using RUE to complete full grasp and transfer beans from one container to another. True Balance x  5 mins using LUE   Towel scrunches x 5 mins for digits and 5 mins thumb scrunch   Boading Balls  5 mins clock-wise and counter clock-wise with moderate difficulty noted   [place] activity  Radial abduction/adduction to move marbles across the towel with thumb   Opposition activity  Pinching beans with each digit and thumb alternating with mod difficulty with 4th and 5th digit. Digital exercises  Flexion, hook fist, finger abb/abdcution   Radial and Palmar abduction x x10 each exercise. Added to HEP   Thumb IP and MP blocked   Flex/ext  MCP, PIP  2 sets x15 with fair tolerance. Wrisitizer  5 mins with LUE and elbow on table for wrist isolation.     AAROM:     MCP place & hold x 2 sets x10 completed with fair tolerance due to limited MCP ROM. (added to HEP)        PROM/Stretching:     MP & IP thumb flexion x Gently at start of session         Scar Mass/Edema Control:     Scar masage x Steri strips oss- gentle massasge and lifting of scar. Can do at home   Size D compression sleeve  Fitted with to wear as needed to decrease pain and edema. Retrograde massage  x Completed to thenar eminence. Strengthening/Fine motor:     Yellow Web x x10 completed with hand flat to press into web. Pt reported 6/10 pain and only 10 were completed. Cards  Flipping, dealing and shuffling with fair tolerance. Light resistance thumb x Completed during extension for scar tissue management and decreasing pain with movement. X15. Added to HEP   Tweezer activity  Using tweezers to  pegs and place into peg board with LUE   Putty and beads activity x Issued tan putty and completed putty exercises to complete at home: , thumb adduction, finger pinch, thumb opposition. Added to [employer] activity  Using L thumb to pull pennies back towards her to work on improvement of movement and functional use. Peg board activity  Completed large pegs than small pegs with LUE with moderate difficulty and cues for preventing rigidity. Paper Clip chain  Completed with BUE's with no difficulty   Stringing beads  Using LUE to complete stringing beads with no difficulty    Other:     Red foam block x Issued for light manipulation and squeezing for HEP. Splint  x Issued L medium comfort cool for during the day and night due to throbbing reported. HEP x Cont  With thumb IP - added MP blocked flex/ext, finger fist, hook and finger add/abduction and wrist wiggle   Kinesio Tape  x 2 basket weave strips down forearm for lymphatic correction. Pt educated on removal technique if irritation should occur     Assessment/Comments: Pt is making Fair + progress toward stated plan of care.  Pt issued tan theraputty and exercises to complete within 5/10 pain. Pt noted to have have pain with full extension and possibility of scar tissue causing pain. Spoke to Surgeon about US which was ok'd to complete. Pt will complete light HEP with putty, place and hold and light resistance exercises within tolerance before next appointment.     -Rehab Potential: Good  -Requires OT Follow Up: Yes  Time In: 11:00 am             Time Out: 12:00 pm             Visit #: 12    Treatment Charges: Mins Units   Modalities- Fluido 15 1   Ther Exercise 25 2   Manual Therapy 20 1   Thera Activities     ADL/Home Mgt      Neuro Re-education     Gait Training     Group Therapy     Non-Billable Service Time-MHP     Other     Total Time/Units 60 4       -Response to Treatment: Pt pleased that her L arm is looking better and less bruised and the pian has remained low - occasionally moderate. Goals: Goals for pt can be see on initial eval occurring on 9/28/2020    Plan:   [x]  Continues Plan of care: Treatment covered based on POC and graduated to patient's progress. Pt education continues at each visit to obtain maximum benefits from skilled OT intervention.   []  Alter Plan of care:   []  Discharge:      Ellen Curtis Jose Luis 85, OTR/L #[id number]

## 2020-11-16 ENCOUNTER — TREATMENT (OUTPATIENT)
Dept: OCCUPATIONAL THERAPY | Age: 60
End: 2020-11-16
Payer: COMMERCIAL

## 2020-11-16 PROCEDURE — 97035 APP MDLTY 1+ULTRASOUND EA 15: CPT | Performed by: OCCUPATIONAL THERAPIST

## 2020-11-16 PROCEDURE — 97140 MANUAL THERAPY 1/> REGIONS: CPT | Performed by: OCCUPATIONAL THERAPIST

## 2020-11-16 PROCEDURE — 97022 WHIRLPOOL THERAPY: CPT | Performed by: OCCUPATIONAL THERAPIST

## 2020-11-16 PROCEDURE — 97110 THERAPEUTIC EXERCISES: CPT | Performed by: OCCUPATIONAL THERAPIST

## 2020-11-16 NOTE — PROGRESS NOTES
OCCUPATIONAL THERAPY PROGRESS NOTE    Date:  2020                  Initial Evaluation Date: 2020    Patient Name:  Yanet Ramirez    :  1960    Restrictions/Precautions:  Follow [facility] arthroplasty [facility] protocol  Diagnosis:  L [facility] OA, [address] arthroplasty [facility]       M18.12 (ICD-10-CM) - Arthritis of carpometacarpal ([facility]) joint of left thumb                            Insurance/Certification information:  Medical [place]   Referring Physician:  Dr. Bro Barger  Date of Surgery/Injury: sx   2020 (9 weeks post op on 2020)  Plan of care signed (Y/N):  Y  Visit# / total visits:     Pain Level: 0/10 however pain with abduction     Subjective: Pt reports her thumb has improved but she still gets pain with abduction or certain motions. Objective:  Updated POC to be completed by visit 10. INTERVENTION: COMPLETED: SPECIFICS/COMMENTS:   Modality:     US  x Duration: 8 mins  Intensity: 0.8  Depth: 3.3 MHz  Duty Cycle: 100%     Ice massage  At the end of the session around the thenar eminence and MCP     MHP  For soft tissue warm-up 15 mins   Fludiotherapy x 15 mins at the start of the session with AROM encouraged. AROM:     Wrist   Flexion, extension, RD, UD over forearm wedge. x15 each exercise   Bean transfer  Using RUE to complete full grasp and transfer beans from one container to another. True Balance   5 mins using LUE   Towel scrunches  5 mins for digits and 5 mins thumb scrunch   Boading Balls  5 mins clock-wise and counter clock-wise with moderate difficulty noted   [place] activity  Radial abduction/adduction to move marbles across the towel with thumb    Thumb Abduction x Using 5th digit and thumb to slide pom poms down 5th digit and place into container with mild difficulty. Opposition activity  Pinching beans with each digit and thumb alternating with mod difficulty with 4th and 5th digit.      Digital exercises  Flexion, hook fist, finger abb/abdcution   Radial and Palmar abduction  x10 each exercise. Added to HEP   Thumb IP and MP blocked   Flex/ext  MCP, PIP  2 sets x15 with fair tolerance. Wrisitizer  5 mins with LUE and elbow on table for wrist isolation. AAROM:     MCP place & hold x 2 sets x10 completed with fair tolerance due to limited MCP ROM. (added to HEP)        PROM/Stretching:     MP & IP thumb flexion x Gently at start of session         Scar Mass/Edema Control:     Scar masage x Steri strips oss- gentle massasge and lifting of scar. Can do at home   Size D compression sleeve  Fitted with to wear as needed to decrease pain and edema. Retrograde massage  x Completed to thenar eminence. Strengthening/Fine motor:     Yellow Web  x10 completed with hand flat to press into web. Pt reported 6/10 pain and only 10 were completed. Cards  Flipping, dealing and shuffling with fair tolerance. Extender x Red x20 with good tolerance   Light resistance thumb x Completed during extension for scar tissue management and decreasing pain with movement. X15. Added to HEP   Theraputty x Yellow: grasping manipulation, abduction lightly. Tweezer activity  Using tweezers to  pegs and place into peg board with LUE   Putty and beads activity  Issued tan putty and completed putty exercises to complete at home: , thumb adduction, finger pinch, thumb opposition. Added to [employer] activity  Using L thumb to pull pennies back towards her to work on improvement of movement and functional use. Peg board activity  Completed large pegs than small pegs with LUE with moderate difficulty and cues for preventing rigidity. Paper Clip chain  Completed with BUE's with no difficulty   Stringing beads  Using LUE to complete stringing beads with no difficulty    Other:     Red foam block x Issued for light manipulation and squeezing for HEP. Splint  x Issued L medium comfort cool for during the day and night due to throbbing reported.     HEP x Cont  With thumb IP - added MP blocked flex/ext, finger fist, hook and finger add/abduction and wrist wiggle   Kinesio Tape  x 2 basket weave strips down forearm for lymphatic correction. Pt educated on removal technique if irritation should occur     Assessment/Comments: Pt is making Fair + progress toward stated plan of care. Pt tolerated session well and reported less pain in the thumb when completing activities at the end of the session. Pt reported buying yellow theraputty and completing at home with good tolerance. Pt educated on continuation of different abduction activities to assist with decreasing pain with motion.     -Rehab Potential: Good  -Requires OT Follow Up: Yes  Time In: 9:00 am             Time Out: 10:00 am             Visit #: 13    Treatment Charges: Mins Units   Modalities- Fluido/US 15/8 1/1   Ther Exercise 20 1   Manual Therapy 17 1   Thera Activities     ADL/Home Mgt      Neuro Re-education     Gait Training     Group Therapy     Non-Billable Service Time-MHP     Other     Total Time/Units 60 4       -Response to Treatment: Pt pleased that her L arm is looking better and less bruised and the pian has remained low - occasionally moderate. Goals: Goals for pt can be see on initial eval occurring on 9/28/2020    Plan:   [x]  Continues Plan of care: Treatment covered based on POC and graduated to patient's progress. Pt education continues at each visit to obtain maximum benefits from skilled OT intervention.   []  Alter Plan of care:   []  Discharge:      Melinda Curtis Jose Luis 85, OTR/L #[id number]

## 2020-11-19 ENCOUNTER — TREATMENT (OUTPATIENT)
Dept: OCCUPATIONAL THERAPY | Age: 60
End: 2020-11-19
Payer: COMMERCIAL

## 2020-11-19 PROCEDURE — 97140 MANUAL THERAPY 1/> REGIONS: CPT | Performed by: OCCUPATIONAL THERAPIST

## 2020-11-19 PROCEDURE — 97022 WHIRLPOOL THERAPY: CPT | Performed by: OCCUPATIONAL THERAPIST

## 2020-11-19 PROCEDURE — 97035 APP MDLTY 1+ULTRASOUND EA 15: CPT | Performed by: OCCUPATIONAL THERAPIST

## 2020-11-19 PROCEDURE — 97110 THERAPEUTIC EXERCISES: CPT | Performed by: OCCUPATIONAL THERAPIST

## 2020-11-19 NOTE — PROGRESS NOTES
OCCUPATIONAL THERAPY PROGRESS NOTE    Date:  2020                  Initial Evaluation Date: 2020    Patient Name:  Yanet Ramirez    :  1960    Restrictions/Precautions:  Follow [facility] arthroplasty [facility] protocol  Diagnosis:  L [facility] OA, [address] arthroplasty [facility]       M18.12 (ICD-10-CM) - Arthritis of carpometacarpal ([facility]) joint of left thumb                            Insurance/Certification information:  Medical [place]   Referring Physician:  Dr. Bro Barger  Date of Surgery/Injury: sx   2020 (9 weeks post op on 2020)  Plan of care signed (Y/N):  Y  Visit# / total visits:     Pain Level: 0/10 at rest and a slight ache when completing strengthening. Subjective: Pt reports she moved up to red putty on her own and reports a dull ache. Objective:  Updated POC to be completed by visit 10. INTERVENTION: COMPLETED: SPECIFICS/COMMENTS:   Modality:     US  x Duration: 8 mins  Intensity: 0.8  Depth: 3.3 MHz  Duty Cycle: 100%     Ice massage  At the end of the session around the thenar eminence and MCP     MHP  For soft tissue warm-up 15 mins   Fludiotherapy x 15 mins at the start of the session with AROM encouraged. AROM:     Wrist   Flexion, extension, RD, UD over forearm wedge. x15 each exercise   Bean transfer  Using RUE to complete full grasp and transfer beans from one container to another. True Balance x  5 mins using LUE   Towel scrunches  5 mins for digits and 5 mins thumb scrunch   Boading Balls  5 mins clock-wise and counter clock-wise with moderate difficulty noted   [place] activity  Radial abduction/adduction to move marbles across the towel with thumb   Thumb Abduction x Using 5th digit and thumb to slide pom poms down 5th digit and place into container with mild difficulty. Opposition activity  Pinching beans with each digit and thumb alternating with mod difficulty with 4th and 5th digit.      Digital exercises  Flexion, hook fist, finger abb/abdcution Radial and Palmar abduction x x10 each exercise. Added to HEP   Thumb IP and MP blocked   Flex/ext  MCP, PIP  2 sets x15 with fair tolerance. Wrisitizer  5 mins with LUE and elbow on table for wrist isolation. AAROM:     MCP place & hold x 2 sets x10 completed with fair tolerance due to limited MCP ROM. (added to HEP)        PROM/Stretching:     MP & IP thumb flexion x Gently at start of session         Scar Mass/Edema Control:     Scar masage x  gentle massasge and lifting of scar. Can do at home   Size D compression sleeve  Fitted with to wear as needed to decrease pain and edema. Retrograde massage  x Completed to thenar eminence. Strengthening/Fine motor:     Yellow Web  x10 completed with hand flat to press into web. Pt reported 6/10 pain and only 10 were completed. Cards  Flipping, dealing and shuffling with fair tolerance. Extender  Red x20 with good tolerance   Light resistance thumb x Completed during extension for scar tissue management and decreasing pain with movement. X15. Added to HEP   Theraputty    Yellow: grasping manipulation, abduction lightly. Tweezer activity  Using tweezers to  pegs and place into peg board with LUE   Putty and beads activity  Issued tan putty and completed putty exercises to complete at home: , thumb adduction, finger pinch, thumb opposition. Added to HEP   Resistive clothes pins x Completed with red clothes pin to  pom poms and place into container. [place] activity  Using L thumb to pull pennies back towards her to work on improvement of movement and functional use. Peg board activity  Completed large pegs than small pegs with LUE with moderate difficulty and cues for preventing rigidity. Paper Clip chain  Completed with BUE's with no difficulty   Stringing beads  Using LUE to complete stringing beads with no difficulty    Other:     Red foam block x Issued for light manipulation and squeezing for HEP.     Splint  x Issued L medium comfort cool for during the day and night due to throbbing reported. HEP x Cont  With thumb IP - added MP blocked flex/ext, finger fist, hook and finger add/abduction and wrist wiggle   Kinesio Tape  x 2 basket weave strips down forearm for lymphatic correction. Pt educated on removal technique if irritation should occur     Assessment/Comments: Pt is making Fair + progress toward stated plan of care. Pt tolerated session well and reported to therapist she has bought red putty and began strengthening with red putty. Therapist cautioned pt to complete putty exercises with no pain. Pt reports she feels she is doing better however still is having slight pain with thumb abduction with stiffness. Pt will be moved to 1x/week with increased home exercises for the remainder of her treatment sessions.     -Rehab Potential: Good  -Requires OT Follow Up: Yes  Time In: 9:00 am             Time Out: 10:00 am             Visit #: 14    Treatment Charges: Mins Units   Modalities- Fluido/US 15/8 1/1   Ther Exercise 20 1   Manual Therapy 17 1   Thera Activities     ADL/Home Mgt      Neuro Re-education     Gait Training     Group Therapy     Non-Billable Service Time-[facility]     Other     Total Time/Units 60 4       -Response to Treatment: Pt pleased that her L arm is looking better and less bruised and the pian has remained low - occasionally moderate. Goals: Goals for pt can be see on initial eval occurring on 9/28/2020    Plan:   [x]  Continues Plan of care: Treatment covered based on POC and graduated to patient's progress. Pt education continues at each visit to obtain maximum benefits from skilled OT intervention.   []  Alter Plan of care:   []  Discharge:      Yovany Curtis Jose Luis 85, OTR/L #[id number]

## 2020-11-23 ENCOUNTER — TREATMENT (OUTPATIENT)
Dept: OCCUPATIONAL THERAPY | Age: 60
End: 2020-11-23
Payer: COMMERCIAL

## 2020-11-23 PROCEDURE — 97035 APP MDLTY 1+ULTRASOUND EA 15: CPT | Performed by: OCCUPATIONAL THERAPIST

## 2020-11-23 PROCEDURE — 97022 WHIRLPOOL THERAPY: CPT | Performed by: OCCUPATIONAL THERAPIST

## 2020-11-23 PROCEDURE — 97110 THERAPEUTIC EXERCISES: CPT | Performed by: OCCUPATIONAL THERAPIST

## 2020-11-23 PROCEDURE — 97140 MANUAL THERAPY 1/> REGIONS: CPT | Performed by: OCCUPATIONAL THERAPIST

## 2020-11-23 NOTE — PROGRESS NOTES
[facility] OCCUPATIONAL THERAPY PROGRESS NOTE    Date:  2020                  Initial Evaluation Date: 2020    Patient Name:  Edy Lopez    :  1960    Restrictions/Precautions:  Follow Aia 16 arthroplasty Stablyx protocol  Diagnosis:  L Aia 16 OA, [address] arthroplasty Stablyx       M18.12 (ICD-10-CM) - Arthritis of carpometacarpal (Aia 16) joint of left thumb                            Insurance/Certification information:  [employer] [place]   Referring Physician:  Dr. Flowers [facility]  Date of Surgery/Injury: sx   2020 (10 weeks post op on 2020)  Plan of care signed (Y/N):  Y  Visit# / total visits: 15  / 18    Pain Level: 0/10 at rest and a slight ache when completing strengthening. Subjective: Pt reports she is still having aching at the thenar eminence. Objective:  Updated POC to be completed by visit 10. INTERVENTION: COMPLETED: SPECIFICS/COMMENTS:   Modality:     US  x Duration: 8 mins  Intensity: 0.8  Depth: 3.3 MHz  Duty Cycle: 100%     Ice massage  At the end of the session around the thenar eminence and MCP     MHP x For soft tissue warm-up 15 mins   Fludiotherapy  15 mins at the start of the session with AROM encouraged. AROM:     Wrist   Flexion, extension, RD, UD over forearm wedge. x15 each exercise   Bean transfer  Using RUE to complete full grasp and transfer beans from one container to another. True Balance   5 mins using LUE   Towel scrunches x 5 mins for digits and 5 mins thumb scrunch   Boading Balls  5 mins clock-wise and counter clock-wise with moderate difficulty noted   [place] activity  Radial abduction/adduction to move marbles across the towel with thumb   Thumb Abduction  Using 5th digit and thumb to slide pom poms down 5th digit and place into container with mild difficulty. Opposition activity  Pinching beans with each digit and thumb alternating with mod difficulty with 4th and 5th digit.      Digital exercises  Flexion, hook fist, finger abb/abdcution   Radial and Palmar abduction x x10 each exercise. Added to HEP   Thumb IP and MP blocked   Flex/ext  MCP, PIP  2 sets x15 with fair tolerance. Wrisitizer  5 mins with LUE and elbow on table for wrist isolation. AAROM:     MCP place & hold x 2 sets x10 completed with fair tolerance due to limited MCP ROM. (added to HEP)        PROM/Stretching:     MP & IP thumb flexion x Gently at start of session         Scar Mass/Edema Control:     Scar masage x  gentle massasge and lifting of scar. Can do at home   Size D compression sleeve  Fitted with to wear as needed to decrease pain and edema. Retrograde massage  x Completed to thenar eminence. Strengthening/Fine motor:     Yellow Web  x10 completed with hand flat to press into web. Pt reported 6/10 pain and only 10 were completed. Therabar x Red 2 sets x10 pronation, supination and twisting. Hand exerciser x 2 sets x10 LUE    Cards  Flipping, dealing and shuffling with fair tolerance. Extender x Red x20 with good tolerance   Light resistance thumb  Completed during extension for scar tissue management and decreasing pain with movement. X15. Added to HEP   Theraputty x   Yellow: grasping manipulation, abduction lightly. Tweezer activity  Using tweezers to  pegs and place into peg board with LUE   Putty and beads activity  Issued tan putty and completed putty exercises to complete at home: , thumb adduction, finger pinch, thumb opposition. Added to HEP   Resistive clothes pins  Completed with red clothes pin to  pom poms and place into container. Elizabeth Boogie activity  Using L thumb to pull pennies back towards her to work on improvement of movement and functional use. Peg board activity  Completed large pegs than small pegs with LUE with moderate difficulty and cues for preventing rigidity.      Paper Clip chain  Completed with BUE's with no difficulty   Stringing beads  Using LUE to complete stringing beads with no difficulty    Other:     Red foam block x Issued for light manipulation and squeezing for HEP. Splint  x Issued L medium comfort cool for during the day and night due to throbbing reported. HEP x Cont  With thumb IP - added MP blocked flex/ext, finger fist, hook and finger add/abduction and wrist wiggle   Kinesio Tape  x 2 basket weave strips down forearm for lymphatic correction. Pt educated on removal technique if irritation should occur     Assessment/Comments: Pt is making Fair + progress toward stated plan of care. Pt tolerated session well with increased strengthening. Pt continues to demonstrate stiffness in the MP joint even with PROM. Pt educated on continuation of lighter resistance activities and MCP place and holds.     -Rehab Potential: Good  -Requires OT Follow Up: Yes  Time In: 9:00 am             Time Out: 10:00 am             Visit #: 15    Treatment Charges: Mins Units   Modalities- Fluido/US 15/8 1/1   Ther Exercise 20 1   Manual Therapy 17 1   Thera Activities     ADL/Home Mgt      Neuro Re-education     Gait Training     Group Therapy     Non-Billable Service Time-MHP     Other     Total Time/Units 60 4       -Response to Treatment: Pt pleased that her L arm is looking better and less bruised and the pian has remained low - occasionally moderate. Goals: Goals for pt can be see on initial eval occurring on 9/28/2020    Plan:   [x]  Continues Plan of care: Treatment covered based on POC and graduated to patient's progress. Pt education continues at each visit to obtain maximum benefits from skilled OT intervention.   []  Alter Plan of care:   []  Discharge:      Mode Curtis Jose Luis 85, OTR/L #[id number]

## 2020-12-02 ENCOUNTER — TREATMENT (OUTPATIENT)
Dept: OCCUPATIONAL THERAPY | Age: 60
End: 2020-12-02

## 2020-12-03 ENCOUNTER — OFFICE VISIT (OUTPATIENT)
Dept: ORTHOPEDIC SURGERY | Age: 60
End: 2020-12-03

## 2020-12-03 VITALS — BODY MASS INDEX: 21.86 KG/M2 | WEIGHT: 136 LBS | HEIGHT: 66 IN | TEMPERATURE: 97.5 F

## 2020-12-03 PROCEDURE — 99024 POSTOP FOLLOW-UP VISIT: CPT | Performed by: ORTHOPAEDIC SURGERY

## 2020-12-03 NOTE — PROGRESS NOTES
Patient seen back today status post left thumb basal joint arthroplasty 10 weeks postop. Biggest complaint is continued stiffness and tightness about the thumb. Physical exam: Scar mature. Negative CMC grind test.  She does have an MCP joint extension of 20 degrees hyperextension. Flexion down to about 20 degrees at the MCP joint. Remains neurovascular intact. X-rays the office today: AP lateral and obliques of the left thumb were obtained and compared to previous imaging. There is a stable implant arthroplasty present. No interval changes when compared to prior imaging. Impression office x-rays: Stable arthroplasty thumb basal joint    Assessment: 10 weeks postop    Plan    Patient is encouraged to continue with therapy. She is currently doing home therapy focusing on flexion and scar management.   Follow-up in 2 to 3 months if needed

## 2021-03-18 NOTE — PROGRESS NOTES
Right: 70#                    Pinch Meter:              Lateral: Left= 8#  7/10 pain,                                                  Right= 16#              Palmar 3 point: Left= 3/10  6/10 pain,                                  Right= 16#  9 Hole Peg Test:              Left: 22 seconds              Right: 18 seconds     QuickDASH Score: 81.8% disability reported        GOALS:  1 out of 12 Goals were obtained/or reached maximum potential at this time. REASON FOR DISCHARGE: Script  and pt did not schedule remainder of her therapy sessions. PATIENT EDUCATION/INSTRUCTIONS: Pt was issued and education each session on HEP, edema management, pain management, and increasing ROM/endurance. RECOMMENDATIONS: Discharge from skilled OT services. Thank you for the opportunity to work with your patient. If you have questions or comments, please feel free to contact us by phone or fax.     Electronically Signed by: Ever Doan 87, OTR/L #[id number]  3/18/2021

## 2022-03-30 ENCOUNTER — HOSPITAL ENCOUNTER (OUTPATIENT)
Dept: MAMMOGRAPHY | Age: 62
Discharge: HOME | End: 2022-04-01
Payer: COMMERCIAL

## 2022-03-30 DIAGNOSIS — Z12.31 BREAST CANCER SCREENING BY MAMMOGRAM: ICD-10-CM

## 2022-03-30 PROCEDURE — 77063 BREAST TOMOSYNTHESIS BI: CPT

## 2022-07-25 ENCOUNTER — PREP FOR PROCEDURE (OUTPATIENT)
Dept: SURGERY | Age: 62
End: 2022-07-25

## 2022-07-25 RX ORDER — SODIUM CHLORIDE 9 MG/ML
INJECTION, SOLUTION INTRAVENOUS CONTINUOUS
Status: CANCELLED | OUTPATIENT
Start: 2022-07-25

## 2022-07-25 NOTE — H&P (VIEW-ONLY)
Name: Sophie Gr                  : 1960 Sex: F  Age: 59 yrs  Acct#:  [id number]            CC:  Dysphagia    HPI: [The patient is a 73-year-old white female who presents with dysphagia. The patient complains of spasms with certain foods. The patient does have a history of hiatal hernia and reflux disease. The patient is not on any medications. The patient also has a cough. The patient denies anorexia and weight loss. The patient denies melena, hematochezia and hematemesis. ]    Meds Prior to Visit:  No Active Medications        Allergies:  NKDA    PMH:  Problem List: Screening for malignant neoplasm of colon, Right lower quadrant pain, Epigastric pain, Diaphragmatic hernia, Gastroesophageal reflux disease  Health Maintenance:  Mammogram - (2017)  Medical Problems:  Skin Cancer - (3/2017) removed  Surgical Hx:  Appendectomy - (1995)  Tonsillectomy - (1991)  Tonsillectomy W/ Adenoidectomy - (1991)  Anesthesia Complications:  Nausea  Vomiting - (10/2016) I was given anti nausea and patch, didn't work. Severe vomiting  Reviewed, no changes. FH:  Father:  Coronary Artery Bypass  Arthritis  Psoriasis  Stroke.  due to Stroke - (2004). Mother:  Appendicitis  Hiatal Hernia  Pneumonia  Diverticulitis  Diabetes. Reviewed, no changes. SH:  Marital: Legal Status: . Personal Habits:  Tobacco Use: Patient has never smoked. Alcohol: Current Alcohol Use; Social.Drug Use: Never Used Drugs. Daily Caffeine: Uses Caffeine. Reviewed, no changes. ROS:  Const: Denies anorexia, anxiety, fatigue, night sweats, weight gain and weight loss. Eyes: Denies eye symptoms. ENMT: Denies ear symptoms. Denies nasal symptoms. Denies mouth or throat symptoms. CV: Denies hypertension and other cardiovascular symptoms. Resp: Denies respiratory symptoms. GI: Denies hepatitis, liver disease and other gastrointestinal symptoms. Musculo: Denies musculoskeletal symptoms.   Skin: Denies skin, hair and nail symptoms. Breast: Denies breast problems. Neuro: Denies neurologic symptoms. Psych: Denies depression and substance abuse. Endocrine: Denies diabetes, kidney disease and thyroid disease. Hema/Lymph: Denies anemia, blood disease, cancer and past transfusion. Allergy/Immuno: Denies immunosuppression. Reviewed, no changes. Wt Prior: 130lb as of 11/16/17    Exam:    Neck is supple without adenopathy or thyromegaly  Lungs are clear to auscultation  Cardiac regular rate and rhythm without murmurs rubs or gallops  Abdomen is soft, nondistended and nontender         Assessment #1: Hx R10.13 Epigastric pain   Care Plan:              Comments       : The patient will undergo an EGD with possible dilatation. I have informed her of the risks, benefits and complications of the procedure and she is willing to proceed. Protonix 20 mg daily  Dietary and social modifications  Repeat colonoscopy in 2024    Moderate complexity    I performed an updated history, physical examination, assessment and plan.      Assessment #2: Hx K44.9 Diaphragmatic hernia without obstruction or gangrene   Care Plan:                Assessment #3: Hx K21.9 Gastro-esophageal reflux disease without esophagitis   Care Plan:                        Seen by:

## 2022-08-16 RX ORDER — PANTOPRAZOLE SODIUM 20 MG/1
20 TABLET, DELAYED RELEASE ORAL DAILY
COMMUNITY

## 2022-08-16 NOTE — PROGRESS NOTES
[facility] PRE-ADMISSION TESTING INSTRUCTIONS      ARRIVAL INSTRUCTIONS:  [x] Parking the day of Surgery is located in the Main Entrance lot. Upon entering the main door make an immediate right to the surgery reception desk. [x] Bring photo ID and insurance card    [] Bring in a copy of Living will or Durable Power of  papers. [x] Please be sure to arrange for responsible adult to provide transportation to and from the hospital    [x] Please arrange for responsible adult to be with you for the 24 hour period post procedure due to having anesthesia    [x] If you awake am of surgery not feeling well or have temperature >100 please call [phone]    GENERAL INSTRUCTIONS:    [x] Nothing by mouth after midnight, including gum, candy, mints or water    [x] You may brush your teeth, but do not swallow any water    [x] Take medications as instructed with 1-2 oz of water    [x] Stop herbal supplements and vitamins 5 days prior to procedure    [x] Follow preop dosing of blood thinners per physician instructions    [] Take 1/2 dose of evening insulin, but no insulin after midnight    [] No oral diabetic medications after midnight    [] If diabetic and have low blood sugar or feel symptomatic, take 1-2oz apple juice only    [] Bring inhalers day of surgery    [] Bring C-PAP/ Bi-Pap day of surgery    [] Bring urine specimen day of surgery    [x] Shower or bath with soap, lather and rinse well, AM of Surgery, no lotion, powders or creams to surgical site    [] Follow bowel prep as instructed per surgeon    [x] No tobacco products within 24 hours of surgery     [x] No alcohol or illegal drug use within 24 hours of surgery.     [x] Jewelry, body piercing's, eyeglasses, contact lenses and dentures are not permitted into surgery (bring cases)      [x] Please do not wear any nail polish, make up or hair products on the day of surgery    [x] You can expect a call the business day prior to procedure to notify you if your arrival time changes    [x] If you receive a survey after surgery we would greatly appreciate your comments    [x] Please notify surgeon if you develop any illness between now and time of surgery (cold, cough, sore throat, fever, nausea, vomiting) or any signs of infections  including skin, wounds, and dental.    []  The Outpatient Pharmacy is available to fill your prescription here on your day of surgery, ask your preop nurse for details

## 2022-08-18 ENCOUNTER — ANESTHESIA EVENT (OUTPATIENT)
Dept: ENDOSCOPY | Age: 62
End: 2022-08-18
Payer: COMMERCIAL

## 2022-08-18 ASSESSMENT — LIFESTYLE VARIABLES: SMOKING_STATUS: 0

## 2022-08-18 NOTE — ANESTHESIA PRE PROCEDURE
Department of Anesthesiology  Preprocedure Note       Name:  Lakisha Denny   Age:  59 y.o.  :  1960                                          MRN:  [id number]         Date:  2022      Surgeon: Jose Tran):  Rafael Stewart MD    Procedure: Procedure(s):  EGD ESOPHAGOGASTRODUODENOSCOPY POSSIBLE DILATION    Medications prior to admission:   Prior to Admission medications    Medication Sig Start Date End Date Taking? Authorizing Provider   pantoprazole (PROTONIX) 20 MG tablet Take 20 mg by mouth in the morning. Yes Historical Provider, MD   ibuprofen (ADVIL) 200 MG tablet Take 200 mg by mouth every 6 hours as needed for Pain    Historical Provider, MD   TURMERIC PO Take by mouth daily    Historical Provider, MD   Probiotic Product (PROBIOTIC DAILY PO) Take by mouth daily    Historical Provider, MD   melatonin 3 MG TABS tablet Take 3 mg by mouth nightly as needed    Historical Provider, MD   Omega-3 Fatty Acids (OMEGA 3 PO) Take by mouth daily Ld 12/15    Historical Provider, MD   Calcium-Magnesium-Vitamin D (CALCIUM 500 PO) Take 1 tablet by mouth 2 times daily Ld 12/15/2017 all vitamins and supplements    Historical Provider, MD   Multiple Vitamins-Minerals (THERAPEUTIC MULTIVITAMIN-MINERALS) tablet Take 1 tablet by mouth daily Ld 12/15    Historical Provider, MD       Current medications:    No current facility-administered medications for this encounter. Current Outpatient Medications   Medication Sig Dispense Refill    pantoprazole (PROTONIX) 20 MG tablet Take 20 mg by mouth in the morning.       ibuprofen (ADVIL) 200 MG tablet Take 200 mg by mouth every 6 hours as needed for Pain      TURMERIC PO Take by mouth daily      Probiotic Product (PROBIOTIC DAILY PO) Take by mouth daily      melatonin 3 MG TABS tablet Take 3 mg by mouth nightly as needed      Omega-3 Fatty Acids (OMEGA 3 PO) Take by mouth daily Ld 12/15      Calcium-Magnesium-Vitamin D (CALCIUM 500 PO) Take 1 tablet by mouth 2 times daily Ld 12/15/2017 all vitamins and supplements      Multiple Vitamins-Minerals (THERAPEUTIC MULTIVITAMIN-MINERALS) tablet Take 1 tablet by mouth daily Ld 12/15         Allergies: Allergies   Allergen Reactions    Propofol Nausea And Vomiting     Severe nausea and vomiting with anesthesia (drug name unknown to patient)        Problem List:    Patient Active Problem List   Diagnosis Code    Arthritis of carpometacarpal (CMC) joint of left thumb M18.12    Carpal tunnel syndrome of right wrist G56.01       Past Medical History:        Diagnosis Date    Arthritis     left thumb    PONV (postoperative nausea and vomiting)     severe immediately per pt after general anesthesia    Raynaud's disease     Skin cancer 2017    neck/shoulder left    Wears glasses        Past Surgical History:        Procedure Laterality Date    ANKLE SURGERY  10/21/2016    stabilization left ankle    APPENDECTOMY      ARTHROPLASTY Left 9/16/2020    LEFT THUMB ARTHROPLASTY   ++STABLYX++ performed [facility]  12/21/2017    normal    SKIN BIOPSY      TONSILLECTOMY         Social History:    Social History     Tobacco Use    Smoking status: Never    Smokeless tobacco: Never   Substance Use Topics    Alcohol use: Yes     Alcohol/week: 2.0 standard drinks     Types: 2 Glasses of wine per week     Comment: occassionally                                Counseling given: Not Answered      Vital Signs (Current):   Vitals:    08/16/22 1535   Weight: 140 lb (63.5 kg)   Height: 5' 6\" (1.676 m)                                              BP Readings from Last 3 Encounters:   09/16/20 (!) 142/73   09/16/20 (!) 155/93   03/02/20 (!) 148/79       NPO Status:                                                                                 BMI:   Wt Readings from Last 3 Encounters:   12/03/20 136 lb (61.7 kg)   10/22/20 136 lb (61.7 kg)   09/24/20 135 lb (61.2 kg)     Body mass index is 22.6 kg/m².     CBC: Lab Results   Component Value Date/Time    WBC 5.1 01/24/2012 04:00 PM    RBC 4.20 01/24/2012 04:00 PM    HGB 13.6 01/24/2012 04:00 PM    HCT 39.2 01/24/2012 04:00 PM    MCV 93.4 01/24/2012 04:00 PM    RDW 13.0 01/24/2012 04:00 PM     01/24/2012 04:00 PM       CMP:   Lab Results   Component Value Date/Time     01/24/2012 04:00 PM    K 4.8 01/24/2012 04:00 PM     01/24/2012 04:00 PM    CO2 31 01/24/2012 04:00 PM    BUN 15 01/24/2012 04:00 PM    CREATININE 0.8 01/24/2012 04:00 PM    LABGLOM >60 01/24/2012 10:31 PM    GLUCOSE 86 03/01/2018 06:23 AM    GLUCOSE 119 01/24/2012 04:00 PM    PROT 7.0 01/24/2012 04:00 PM    CALCIUM 9.9 01/24/2012 04:00 PM    BILITOT 0.6 01/24/2012 04:00 PM    ALKPHOS 55 01/24/2012 04:00 PM    AST 25 01/24/2012 04:00 PM    ALT 20 01/24/2012 04:00 PM       POC Tests: No results for input(s): POCGLU, POCNA, POCK, POCCL, POCBUN, POCHEMO, POCHCT in the last 72 hours. Coags: No results found for: PROTIME, INR, APTT    HCG (If Applicable):   Lab Results   Component Value Date    PREGTESTUR NEGATIVE 12/01/2010        ABGs: No results found for: PHART, PO2ART, VGP7BLB, CXL2YOQ, BEART, D5JUSUWM     Type & Screen (If Applicable):  No results found for: LABABO, LABRH    Drug/Infectious Status (If Applicable):  No results found for: HIV, HEPCAB    COVID-19 Screening (If Applicable):   Lab Results   Component Value Date/Time    COVID19 Not Detected 09/11/2020 07:29 AM           Anesthesia Evaluation  Patient summary reviewed   history of anesthetic complications: PONV.   Airway: Mallampati: II  TM distance: >3 FB   Neck ROM: full  Mouth opening: > = 3 FB   Dental: normal exam         Pulmonary:Negative Pulmonary ROS breath sounds clear to auscultation      (-) not a current smoker                           Cardiovascular:Negative CV ROS          ECG reviewed  Rhythm: regular  Rate: normal                    Neuro/Psych:   (+) neuromuscular disease (Carpal tunnel syndrome of right wrist):,             GI/Hepatic/Renal:            ROS comment: Dysphagia. Endo/Other:    (+) : arthritis (Arthritis of carpometacarpal (CMC) joint of left thumb): OA., .                 Abdominal:             Vascular:           ROS comment: Raynaud's disease. Other Findings:           Anesthesia Plan      MAC     ASA 2       Induction: intravenous. Anesthetic plan and risks discussed with patient. Plan discussed with CRNA.                     Jordana Maurer MD   8/18/2022      PEARL Barone - CRNA

## 2022-08-19 ENCOUNTER — ANESTHESIA (OUTPATIENT)
Dept: ENDOSCOPY | Age: 62
End: 2022-08-19
Payer: COMMERCIAL

## 2022-08-19 ENCOUNTER — HOSPITAL ENCOUNTER (OUTPATIENT)
Age: 62
Setting detail: OUTPATIENT SURGERY
Discharge: HOME | End: 2022-08-19
Attending: SURGERY | Admitting: SURGERY
Payer: COMMERCIAL

## 2022-08-19 VITALS
SYSTOLIC BLOOD PRESSURE: 146 MMHG | HEIGHT: 66 IN | RESPIRATION RATE: 16 BRPM | TEMPERATURE: 97.5 F | HEART RATE: 86 BPM | WEIGHT: 140 LBS | BODY MASS INDEX: 22.5 KG/M2 | DIASTOLIC BLOOD PRESSURE: 72 MMHG | OXYGEN SATURATION: 97 %

## 2022-08-19 DIAGNOSIS — R13.10 DYSPHAGIA, UNSPECIFIED TYPE: ICD-10-CM

## 2022-08-19 PROCEDURE — 2580000003 HC RX 258: Performed by: SURGERY

## 2022-08-19 PROCEDURE — 3609017700 HC EGD DILATION GASTRIC/DUODENAL STRICTURE: Performed by: SURGERY

## 2022-08-19 PROCEDURE — 6360000002 HC RX W HCPCS: Performed by: NURSE ANESTHETIST, CERTIFIED REGISTERED

## 2022-08-19 PROCEDURE — 88305 TISSUE EXAM BY PATHOLOGIST: CPT

## 2022-08-19 PROCEDURE — 7100000011 HC PHASE II RECOVERY - ADDTL 15 MIN: Performed by: SURGERY

## 2022-08-19 PROCEDURE — 7100000010 HC PHASE II RECOVERY - FIRST 15 MIN: Performed by: SURGERY

## 2022-08-19 PROCEDURE — 3700000000 HC ANESTHESIA ATTENDED CARE: Performed by: SURGERY

## 2022-08-19 PROCEDURE — 2709999900 HC NON-CHARGEABLE SUPPLY: Performed by: SURGERY

## 2022-08-19 PROCEDURE — 3700000001 HC ADD 15 MINUTES (ANESTHESIA): Performed by: SURGERY

## 2022-08-19 RX ORDER — ONDANSETRON 2 MG/ML
INJECTION INTRAMUSCULAR; INTRAVENOUS PRN
Status: DISCONTINUED | OUTPATIENT
Start: 2022-08-19 | End: 2022-08-19 | Stop reason: SDUPTHER

## 2022-08-19 RX ORDER — SODIUM CHLORIDE 9 MG/ML
INJECTION, SOLUTION INTRAVENOUS CONTINUOUS
Status: DISCONTINUED | OUTPATIENT
Start: 2022-08-19 | End: 2022-08-19 | Stop reason: HOSPADM

## 2022-08-19 RX ORDER — PROPOFOL 10 MG/ML
INJECTION, EMULSION INTRAVENOUS PRN
Status: DISCONTINUED | OUTPATIENT
Start: 2022-08-19 | End: 2022-08-19 | Stop reason: SDUPTHER

## 2022-08-19 RX ADMIN — PROPOFOL 250 MG: 10 INJECTION, EMULSION INTRAVENOUS at 07:07

## 2022-08-19 RX ADMIN — ONDANSETRON 4 MG: 2 INJECTION INTRAMUSCULAR; INTRAVENOUS at 07:07

## 2022-08-19 RX ADMIN — SODIUM CHLORIDE: 9 INJECTION, SOLUTION INTRAVENOUS at 07:03

## 2022-08-19 NOTE — INTERVAL H&P NOTE
Update History & Physical    The patient's History and Physical of August 19, 2022 was reviewed with the patient and I examined the patient. There was no change. The surgical site was confirmed by the patient and me. Plan: The risks, benefits, expected outcome, and alternative to the recommended procedure have been discussed with the patient. Patient understands and wants to proceed with the procedure.      Electronically signed by Amelia Dhillon MD on 8/19/2022 at 6:52 AM

## 2022-08-19 NOTE — DISCHARGE INSTRUCTIONS
Resume normal activity tomorrow  Resume a normal diet today  Continue medications as prescribed  Please contact the office with chest pain, shortness of breath, fevers, chills and or abdominal pain.

## 2022-08-19 NOTE — OP NOTE
Operative Note      Patient: Layne Cohn  YOB: 1960  MRN: [id number]    Date of Procedure: 8/19/2022    Pre-Op Diagnosis: Dysphagia, unspecified type [R13.10]    Post-Op Diagnosis:  Possible esophageal motility disorder  Type I hiatal hernia with the GE junction at 35 cm from incisors and the diaphragm at 40 cm from incisors  Schatzki ring  Mild gastritis  Normal duodenum       Procedure(s):  EGD ESOPHAGOGASTRODUODENOSCOPY POSSIBLE DILATION  EGD BIOPSY    Surgeon(s):  Ruthie Akins MD    Assistant:   * No surgical staff found *    Anesthesia: Monitor Anesthesia Care    Estimated Blood Loss (mL): Minimal    Complications: None    Specimens:   ID Type Source Tests Collected by Time Destination   A : antral bx  Tissue Stomach SURGICAL PATHOLOGY Ruthie Akins MD 8/19/2022 0710    B : esophagual bx  Tissue Esophagus SURGICAL PATHOLOGY Ruthie Akins MD 8/19/2022 3862        Implants:  * No implants in log *      Drains: * No LDAs found *    Findings:     Detailed Description of Procedure: The patient was brought to the endoscopy suite and placed on the table left lateral decubitus position. The patient received anesthesia per the department of anesthesiology. A bite-block was placed. The endoscope was passed through the lumen of the esophagus, stomach and duodenum. The endoscope was retrieved. The duodenum was normal.  Upon reentry into the stomach, the patient had a mild gastritis. Antral biopsies were obtained. The patient did have a small type I hiatal hernia. The diaphragm was measured at 40 cm from incisors and the GE junction at 35 cm from incisors. The patient did have a Schatzki ring. The 50 and 46 Greek bougie's were passed and followed by endoscopic evaluation. No injuries were noted. The esophagus was biopsied for motility disorder. The remainder of the examination was uneventful. The patient tolerated the procedure well.     Assessment:    Type I hiatal hernia with reflux disease and a Schatzki ring    Plan:    PPI  Dietary restrictions for reflux disease  Possible esophagram for motility disorder    Electronically signed by Imani Manrique MD on 8/19/2022 at 7:18 AM

## 2022-08-19 NOTE — ANESTHESIA POSTPROCEDURE EVALUATION
Department of Anesthesiology  Postprocedure Note    Patient: Tao Nuñez  MRN: [id number]  YOB: 1960  Date of evaluation: 8/19/2022      Procedure Summary     Date: 08/19/22 Room / Location: [facility] 02 / [address] [facility]    Anesthesia Start: 7454 Anesthesia Stop: 7095    Procedures:       EGD ESOPHAGOGASTRODUODENOSCOPY POSSIBLE DILATION      EGD BIOPSY Diagnosis:       Dysphagia, unspecified type      (Dysphagia, unspecified type [R13.10])    Surgeons: Pradeep Terry MD Responsible Provider: Nydia Mckeon MD    Anesthesia Type: MAC ASA Status: 2          Anesthesia Type: No value filed.     Cari Phase I: Cari Score: 10    Cari Phase II:        Anesthesia Post Evaluation    Patient location during evaluation: PACU  Patient participation: complete - patient participated  Level of consciousness: awake and alert  Airway patency: patent  Nausea & Vomiting: no nausea and no vomiting  Complications: no  Cardiovascular status: blood pressure returned to baseline  Respiratory status: acceptable and room air  Hydration status: euvolemic

## 2023-06-07 ENCOUNTER — HOSPITAL ENCOUNTER (OUTPATIENT)
Dept: MAMMOGRAPHY | Age: 63
Discharge: HOME | End: 2023-06-09
Payer: COMMERCIAL

## 2023-06-07 DIAGNOSIS — Z12.31 ENCOUNTER FOR SCREENING MAMMOGRAM FOR MALIGNANT NEOPLASM OF BREAST: ICD-10-CM

## 2023-06-07 PROCEDURE — 77063 BREAST TOMOSYNTHESIS BI: CPT

## 2024-08-02 ENCOUNTER — TRANSCRIBE ORDERS (OUTPATIENT)
Dept: ADMINISTRATIVE | Age: 64
End: 2024-08-02

## 2024-08-02 DIAGNOSIS — Z12.31 ENCOUNTER FOR SCREENING MAMMOGRAM FOR MALIGNANT NEOPLASM OF BREAST: Primary | ICD-10-CM

## 2024-08-09 ENCOUNTER — HOSPITAL ENCOUNTER (OUTPATIENT)
Dept: MAMMOGRAPHY | Age: 64
End: 2024-08-09
Payer: MEDICARE

## 2024-08-09 DIAGNOSIS — Z12.31 ENCOUNTER FOR SCREENING MAMMOGRAM FOR MALIGNANT NEOPLASM OF BREAST: ICD-10-CM

## 2024-08-09 PROCEDURE — 77063 BREAST TOMOSYNTHESIS BI: CPT

## 2025-03-05 ENCOUNTER — HOSPITAL ENCOUNTER (OUTPATIENT)
Age: 65
Discharge: HOME | End: 2025-03-07
Payer: MEDICARE

## 2025-03-05 ENCOUNTER — HOSPITAL ENCOUNTER (OUTPATIENT)
Dept: GENERAL RADIOLOGY | Age: 65
Discharge: HOME | End: 2025-03-07
Payer: MEDICARE

## 2025-03-05 DIAGNOSIS — R10.2 PELVIC PAIN IN FEMALE: ICD-10-CM

## 2025-03-05 DIAGNOSIS — M54.50 LUMBAR PAIN: ICD-10-CM

## 2025-03-05 PROCEDURE — 72110 X-RAY EXAM L-2 SPINE 4/>VWS: CPT

## 2025-03-05 PROCEDURE — 72170 X-RAY EXAM OF PELVIS: CPT

## 2025-05-13 ENCOUNTER — HOSPITAL ENCOUNTER (OUTPATIENT)
Dept: ULTRASOUND IMAGING | Age: 65
Discharge: HOME | End: 2025-05-15
Payer: MEDICARE

## 2025-05-13 DIAGNOSIS — R60.9 EDEMA, UNSPECIFIED TYPE: ICD-10-CM

## 2025-05-13 PROCEDURE — 93971 EXTREMITY STUDY: CPT

## (undated) DEVICE — INTENDED FOR TISSUE SEPARATION, AND OTHER PROCEDURES THAT REQUIRE A SHARP SURGICAL BLADE TO PUNCTURE OR CUT.: Brand: BARD-PARKER ® STAINLESS STEEL BLADES

## (undated) DEVICE — SPONGE GZ W4XL4IN RAYON POLY FILL CVR W/ NONWOVEN FAB

## (undated) DEVICE — OSTEOTOME SURG L5IN BLDE W15MM STR MINI LAMBOTTE

## (undated) DEVICE — TRAY DRILL SYSTEM 4 REUSABLE

## (undated) DEVICE — SPLINT ORTH W4XL15IN PLSTR OF PARIS LO EXOTHERM SMOOTH

## (undated) DEVICE — HEWSON SUTURE RETRIEVER: Brand: HEWSON SUTURE RETRIEVER

## (undated) DEVICE — GLOVE SURG SZ 6 THK91MIL LTX FREE SYN POLYISOPRENE ANTI

## (undated) DEVICE — GLOVE ORANGE PI 8 1/2   MSG9085

## (undated) DEVICE — GLOVE SURG SZ 65 THK91MIL LTX FREE SYN POLYISOPRENE

## (undated) DEVICE — ZIMMER® STERILE DISPOSABLE TOURNIQUET CUFF WITH PLC, DUAL PORT, SINGLE BLADDER, 18 IN. (46 CM)

## (undated) DEVICE — PRECISION THIN (9.0 X 0.38 X 25.0MM)

## (undated) DEVICE — FORCEPS BX OVL CUP FEN DISPOSABLE CAP L 160CM RAD JAW 4

## (undated) DEVICE — SOLUTION IV IRRIG POUR BRL 0.9% SODIUM CHL 2F7124

## (undated) DEVICE — SUTURE FIBERLOOP 4-0 L10IN NONABSORBABLE BLU L17.9MM 3/8 AR722920

## (undated) DEVICE — DRAPE CARM MINI FOR IMAG SYS INSIGHT FLROSCN

## (undated) DEVICE — GOWN,SIRUS,FABRNF,L,20/CS: Brand: MEDLINE

## (undated) DEVICE — 4-PORT MANIFOLD: Brand: NEPTUNE 2

## (undated) DEVICE — GRADUATE TRIANG MEASURE 1000ML BLK PRNT

## (undated) DEVICE — INSTRUMENT SYSTEM 4 BATTERY REUSABLE

## (undated) DEVICE — TRAY SET HAND REUSABLE

## (undated) DEVICE — 2.0MM ROUND SOLID CARBIDE BUR MEDIUM

## (undated) DEVICE — PADDING UNDERCAST W4INXL4YD COT FBR LO LINTING WYTEX

## (undated) DEVICE — DOUBLE BASIN SET: Brand: MEDLINE INDUSTRIES, INC.

## (undated) DEVICE — CRADLE ARM W8.75XH12.5XL16IN FOAM SUPP ELEVATION VENT

## (undated) DEVICE — SURGICAL PROCEDURE PACK HND

## (undated) DEVICE — PADDING,UNDERCAST,COTTON, 3X4YD STERILE: Brand: MEDLINE

## (undated) DEVICE — SOLUTION IV IRRIG WATER 1000ML POUR BRL 2F7114

## (undated) DEVICE — BLOCK BITE 60FR RUBBER ADLT DENTAL

## (undated) DEVICE — SYSTEM TPS ORTHO